# Patient Record
Sex: FEMALE | Race: BLACK OR AFRICAN AMERICAN | URBAN - METROPOLITAN AREA
[De-identification: names, ages, dates, MRNs, and addresses within clinical notes are randomized per-mention and may not be internally consistent; named-entity substitution may affect disease eponyms.]

---

## 2017-06-12 ENCOUNTER — OFFICE VISIT (OUTPATIENT)
Dept: GYNECOLOGY | Age: 47
End: 2017-06-12

## 2017-06-12 VITALS
OXYGEN SATURATION: 96 % | HEIGHT: 68 IN | DIASTOLIC BLOOD PRESSURE: 85 MMHG | BODY MASS INDEX: 21.79 KG/M2 | SYSTOLIC BLOOD PRESSURE: 138 MMHG | HEART RATE: 110 BPM | TEMPERATURE: 98.2 F | WEIGHT: 143.8 LBS

## 2017-06-12 DIAGNOSIS — Z01.411 ENCOUNTER FOR GYNECOLOGICAL EXAMINATION WITH ABNORMAL FINDING: Primary | ICD-10-CM

## 2017-06-12 DIAGNOSIS — D25.9 UTERINE LEIOMYOMA, UNSPECIFIED LOCATION: ICD-10-CM

## 2017-06-12 PROCEDURE — 99396 PREV VISIT EST AGE 40-64: CPT | Performed by: OBSTETRICS & GYNECOLOGY

## 2017-06-14 LAB
HPV COMMENT: NORMAL
HPV TYPE 16: NOT DETECTED
HPV TYPE 18: NOT DETECTED
HPVOH (OTHER TYPES): NOT DETECTED

## 2017-12-14 PROBLEM — S82.401A TIBIA/FIBULA FRACTURE, RIGHT, CLOSED, INITIAL ENCOUNTER: Status: ACTIVE | Noted: 2017-12-14

## 2017-12-14 PROBLEM — S82.201A TIBIA/FIBULA FRACTURE, RIGHT, CLOSED, INITIAL ENCOUNTER: Status: ACTIVE | Noted: 2017-12-14

## 2017-12-18 ENCOUNTER — TELEPHONE (OUTPATIENT)
Dept: GYNECOLOGY | Age: 47
End: 2017-12-18

## 2017-12-20 DIAGNOSIS — S82.401A TIBIA/FIBULA FRACTURE, RIGHT, CLOSED, INITIAL ENCOUNTER: Primary | ICD-10-CM

## 2017-12-20 DIAGNOSIS — S82.201A TIBIA/FIBULA FRACTURE, RIGHT, CLOSED, INITIAL ENCOUNTER: Primary | ICD-10-CM

## 2017-12-20 RX ORDER — HYDROCODONE BITARTRATE AND ACETAMINOPHEN 5; 325 MG/1; MG/1
1 TABLET ORAL EVERY 6 HOURS PRN
Qty: 28 TABLET | Refills: 0 | Status: SHIPPED | OUTPATIENT
Start: 2017-12-20 | End: 2020-09-15 | Stop reason: ALTCHOICE

## 2017-12-21 RX ORDER — HYDROCODONE BITARTRATE AND ACETAMINOPHEN 5; 325 MG/1; MG/1
1 TABLET ORAL EVERY 6 HOURS PRN
Qty: 28 TABLET | Refills: 0 | Status: SHIPPED | OUTPATIENT
Start: 2017-12-21 | End: 2020-09-15 | Stop reason: ALTCHOICE

## 2017-12-26 ENCOUNTER — TELEPHONE (OUTPATIENT)
Dept: ORTHOPEDIC SURGERY | Age: 47
End: 2017-12-26

## 2018-01-03 ENCOUNTER — OFFICE VISIT (OUTPATIENT)
Dept: INTERNAL MEDICINE CLINIC | Age: 48
End: 2018-01-03

## 2018-01-03 VITALS
TEMPERATURE: 98 F | DIASTOLIC BLOOD PRESSURE: 86 MMHG | OXYGEN SATURATION: 99 % | HEART RATE: 88 BPM | SYSTOLIC BLOOD PRESSURE: 134 MMHG

## 2018-01-03 DIAGNOSIS — S82.141A CLOSED FRACTURE OF RIGHT TIBIAL PLATEAU, INITIAL ENCOUNTER: Primary | ICD-10-CM

## 2018-01-03 DIAGNOSIS — D50.0 IRON DEFICIENCY ANEMIA DUE TO CHRONIC BLOOD LOSS: ICD-10-CM

## 2018-01-03 LAB
BASOPHILS ABSOLUTE: 0.2 K/UL (ref 0–0.2)
BASOPHILS RELATIVE PERCENT: 2.7 %
EOSINOPHILS ABSOLUTE: 0.3 K/UL (ref 0–0.6)
EOSINOPHILS RELATIVE PERCENT: 4.1 %
HCT VFR BLD CALC: 34.9 % (ref 36–48)
HEMOGLOBIN: 10.3 G/DL (ref 12–16)
IRON SATURATION: 11 % (ref 15–50)
IRON: 33 UG/DL (ref 37–145)
LYMPHOCYTES ABSOLUTE: 1.6 K/UL (ref 1–5.1)
LYMPHOCYTES RELATIVE PERCENT: 26.2 %
MCH RBC QN AUTO: 24.8 PG (ref 26–34)
MCHC RBC AUTO-ENTMCNC: 29.6 G/DL (ref 31–36)
MCV RBC AUTO: 83.8 FL (ref 80–100)
MONOCYTES ABSOLUTE: 0.5 K/UL (ref 0–1.3)
MONOCYTES RELATIVE PERCENT: 8.5 %
NEUTROPHILS ABSOLUTE: 3.7 K/UL (ref 1.7–7.7)
NEUTROPHILS RELATIVE PERCENT: 58.5 %
PDW BLD-RTO: 22.2 % (ref 12.4–15.4)
PLATELET # BLD: 632 K/UL (ref 135–450)
PMV BLD AUTO: 8.7 FL (ref 5–10.5)
RBC # BLD: 4.16 M/UL (ref 4–5.2)
TOTAL IRON BINDING CAPACITY: 302 UG/DL (ref 260–445)
VITAMIN D 25-HYDROXY: 15.9 NG/ML
WBC # BLD: 6.3 K/UL (ref 4–11)

## 2018-01-03 PROCEDURE — G8484 FLU IMMUNIZE NO ADMIN: HCPCS | Performed by: NURSE PRACTITIONER

## 2018-01-03 PROCEDURE — 99202 OFFICE O/P NEW SF 15 MIN: CPT | Performed by: NURSE PRACTITIONER

## 2018-01-03 PROCEDURE — G8420 CALC BMI NORM PARAMETERS: HCPCS | Performed by: NURSE PRACTITIONER

## 2018-01-03 PROCEDURE — G8427 DOCREV CUR MEDS BY ELIG CLIN: HCPCS | Performed by: NURSE PRACTITIONER

## 2018-01-03 PROCEDURE — 1111F DSCHRG MED/CURRENT MED MERGE: CPT | Performed by: NURSE PRACTITIONER

## 2018-01-03 PROCEDURE — 4004F PT TOBACCO SCREEN RCVD TLK: CPT | Performed by: NURSE PRACTITIONER

## 2018-01-03 RX ORDER — PNV NO.95/FERROUS FUM/FOLIC AC 28MG-0.8MG
TABLET ORAL
Qty: 60 TABLET | Refills: 0 | Status: SHIPPED | OUTPATIENT
Start: 2018-01-03 | End: 2018-02-13 | Stop reason: SDUPTHER

## 2018-01-03 RX ORDER — PNV NO.95/FERROUS FUM/FOLIC AC 28MG-0.8MG
1 TABLET ORAL DAILY
Qty: 30 TABLET | Refills: 0 | Status: SHIPPED | OUTPATIENT
Start: 2018-01-03 | End: 2018-01-03 | Stop reason: SDUPTHER

## 2018-01-05 RX ORDER — ERGOCALCIFEROL (VITAMIN D2) 1250 MCG
50000 CAPSULE ORAL WEEKLY
Qty: 12 CAPSULE | Refills: 1 | Status: SHIPPED | OUTPATIENT
Start: 2018-01-05 | End: 2018-06-15 | Stop reason: SDUPTHER

## 2018-01-10 ENCOUNTER — OFFICE VISIT (OUTPATIENT)
Dept: ORTHOPEDIC SURGERY | Age: 48
End: 2018-01-10

## 2018-01-10 VITALS — BODY MASS INDEX: 23.95 KG/M2 | WEIGHT: 158 LBS | RESPIRATION RATE: 16 BRPM | HEIGHT: 68 IN

## 2018-01-10 DIAGNOSIS — S82.201A TIBIA/FIBULA FRACTURE, RIGHT, CLOSED, INITIAL ENCOUNTER: Primary | ICD-10-CM

## 2018-01-10 DIAGNOSIS — S82.401A TIBIA/FIBULA FRACTURE, RIGHT, CLOSED, INITIAL ENCOUNTER: Primary | ICD-10-CM

## 2018-01-10 PROCEDURE — 99024 POSTOP FOLLOW-UP VISIT: CPT | Performed by: NURSE PRACTITIONER

## 2018-02-13 RX ORDER — FERROUS SULFATE 325(65) MG
TABLET ORAL
Qty: 60 TABLET | Refills: 3 | Status: SHIPPED | OUTPATIENT
Start: 2018-02-13 | End: 2018-06-01 | Stop reason: SDUPTHER

## 2018-03-07 ENCOUNTER — OFFICE VISIT (OUTPATIENT)
Dept: ORTHOPEDIC SURGERY | Age: 48
End: 2018-03-07

## 2018-03-07 VITALS — DIASTOLIC BLOOD PRESSURE: 84 MMHG | HEART RATE: 87 BPM | RESPIRATION RATE: 14 BRPM | SYSTOLIC BLOOD PRESSURE: 128 MMHG

## 2018-03-07 DIAGNOSIS — S82.201A TIBIA/FIBULA FRACTURE, RIGHT, CLOSED, INITIAL ENCOUNTER: Primary | ICD-10-CM

## 2018-03-07 DIAGNOSIS — S82.401A TIBIA/FIBULA FRACTURE, RIGHT, CLOSED, INITIAL ENCOUNTER: Primary | ICD-10-CM

## 2018-03-07 PROCEDURE — 99024 POSTOP FOLLOW-UP VISIT: CPT | Performed by: ORTHOPAEDIC SURGERY

## 2018-03-09 NOTE — PROGRESS NOTES
locking plate,  and doing very well. PLAN:  I have told the patient to work on ROM, as well as strengthening exercises. She can be WBAT using a walker with No heavy impact activities. The patient will come back for a follow up in 6 weeks. At that time, we will take four views right knee.     Rupal Beaver MD

## 2018-03-21 ENCOUNTER — HOSPITAL ENCOUNTER (OUTPATIENT)
Dept: OTHER | Age: 48
Discharge: HOME | End: 2018-03-31
Attending: ORTHOPAEDIC SURGERY | Admitting: ORTHOPAEDIC SURGERY

## 2018-03-21 ASSESSMENT — PAIN DESCRIPTION - FREQUENCY: FREQUENCY: CONTINUOUS

## 2018-03-21 ASSESSMENT — PAIN DESCRIPTION - LOCATION: LOCATION: LEG

## 2018-03-21 ASSESSMENT — PAIN SCALES - GENERAL: PAINLEVEL_OUTOF10: 5

## 2018-03-21 ASSESSMENT — PAIN DESCRIPTION - PROGRESSION: CLINICAL_PROGRESSION: GRADUALLY IMPROVING

## 2018-03-21 ASSESSMENT — PAIN DESCRIPTION - ORIENTATION: ORIENTATION: RIGHT

## 2018-03-21 ASSESSMENT — PAIN DESCRIPTION - ONSET: ONSET: SUDDEN

## 2018-03-21 ASSESSMENT — PAIN DESCRIPTION - DESCRIPTORS: DESCRIPTORS: ACHING;SORE;SHARP;CONSTANT

## 2018-03-21 ASSESSMENT — PAIN DESCRIPTION - PAIN TYPE: TYPE: SURGICAL PAIN

## 2018-03-23 ENCOUNTER — HOSPITAL ENCOUNTER (OUTPATIENT)
Dept: PHYSICAL THERAPY | Age: 48
Discharge: HOME | End: 2018-03-24
Admitting: ORTHOPAEDIC SURGERY
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
## 2018-03-27 ENCOUNTER — HOSPITAL ENCOUNTER (OUTPATIENT)
Dept: PHYSICAL THERAPY | Age: 48
Discharge: HOME | End: 2018-03-28
Admitting: ORTHOPAEDIC SURGERY

## 2018-03-29 ENCOUNTER — HOSPITAL ENCOUNTER (OUTPATIENT)
Dept: PHYSICAL THERAPY | Age: 48
Discharge: HOME | End: 2018-03-30
Admitting: ORTHOPAEDIC SURGERY

## 2018-04-01 ENCOUNTER — HOSPITAL ENCOUNTER (OUTPATIENT)
Dept: OTHER | Age: 48
Discharge: HOME | End: 2018-04-30
Attending: ORTHOPAEDIC SURGERY | Admitting: ORTHOPAEDIC SURGERY

## 2018-04-05 ENCOUNTER — HOSPITAL ENCOUNTER (OUTPATIENT)
Dept: PHYSICAL THERAPY | Age: 48
Discharge: HOME | End: 2018-04-06
Admitting: ORTHOPAEDIC SURGERY

## 2018-04-05 NOTE — FLOWSHEET NOTE
above  Objective: See eval  Observation:   Test measurements:      Exercises:  Exercise/Equipment Resistance/Repetitions Other comments   Nu step  X 6 min L4    Sl abd X 30    SLR 2 x 10         Heel slides X 30,  30 x 3      Added 03/23   Standing tke X 30    Step up 4\" x 20 ea    bosu X 2 min Added 03/23   Weight shifting in ll bars 2 min Added 0323   wobble X 2 min ea         Leg press 70# x 30, right 35# x 30    Step thru X 2 min    Standing hams stretch     inclijne 60 x 3    Gait in bars with emphasis on reaching terminal knee ext X 3 min    sls X 2 min                     Other Therapeutic Activities:    Reviewed LEFSTOOL and practiced with rolling walker. Advised pt to use walker as need and gradually wean from it as she becomes stronger. Home Exercise Program:    Gave written instructions for HEP and pt understands and performs well. Manual Treatments: mfr to quads, quads tendon, patellar mobs gr 3 x 10 min     Modalities:    CP x 10 min to right knee with bolster under knee  Timed Code Treatment Minutes:    42  Total Treatment Minutes:  55    Treatment/Activity Tolerance:  [x] Patient tolerated treatment well [] Patient limited by fatigue  [] Patient limited by pain  [] Patient limited by other medical complications  [] Other:     Prognosis: [x] Good [] Fair  [] Poor    Patient Requires Follow-up: [x] Yes  [] No    Plan:   [] Continue per plan of care [] Alter current plan (see comments)  [x] Plan of care initiated [] Hold pending MD visit [] Discharge  Plan for Next Session:    Advance exercises as tolerated for strengthening and increased knee ROM. Advance gait as able to without walker.  Told her to look into getting a cane , edema noted in inf knee, told her to make sure to ice as well,4/3/18  wirk on end range ext  Electronically signed by:  Fe Ramirez, PT

## 2018-04-17 ENCOUNTER — HOSPITAL ENCOUNTER (OUTPATIENT)
Dept: PHYSICAL THERAPY | Age: 48
Discharge: HOME | End: 2018-04-18
Admitting: ORTHOPAEDIC SURGERY
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
## 2018-05-01 ENCOUNTER — HOSPITAL ENCOUNTER (OUTPATIENT)
Dept: OTHER | Age: 48
Discharge: HOME | End: 2018-05-31
Attending: ORTHOPAEDIC SURGERY | Admitting: ORTHOPAEDIC SURGERY

## 2018-06-01 RX ORDER — FERROUS SULFATE 325(65) MG
TABLET ORAL
Qty: 60 TABLET | Refills: 3 | Status: SHIPPED | OUTPATIENT
Start: 2018-06-01 | End: 2020-09-15 | Stop reason: ALTCHOICE

## 2018-06-15 RX ORDER — ERGOCALCIFEROL 1.25 MG/1
50000 CAPSULE ORAL WEEKLY
Qty: 12 CAPSULE | Refills: 1 | Status: SHIPPED | OUTPATIENT
Start: 2018-06-15 | End: 2020-09-15 | Stop reason: ALTCHOICE

## 2020-09-04 ENCOUNTER — TELEPHONE (OUTPATIENT)
Dept: GYNECOLOGY | Age: 50
End: 2020-09-04

## 2020-09-15 ENCOUNTER — OFFICE VISIT (OUTPATIENT)
Dept: GYNECOLOGY | Age: 50
End: 2020-09-15
Payer: COMMERCIAL

## 2020-09-15 VITALS
HEIGHT: 68 IN | OXYGEN SATURATION: 99 % | RESPIRATION RATE: 16 BRPM | DIASTOLIC BLOOD PRESSURE: 85 MMHG | SYSTOLIC BLOOD PRESSURE: 136 MMHG | HEART RATE: 109 BPM | WEIGHT: 152 LBS | TEMPERATURE: 97.3 F | BODY MASS INDEX: 23.04 KG/M2

## 2020-09-15 DIAGNOSIS — D25.0 SUBMUCOUS AND SUBSEROUS LEIOMYOMA OF UTERUS: ICD-10-CM

## 2020-09-15 DIAGNOSIS — D25.2 SUBMUCOUS AND SUBSEROUS LEIOMYOMA OF UTERUS: ICD-10-CM

## 2020-09-15 LAB
HCT VFR BLD CALC: 30.6 % (ref 36–48)
HEMOGLOBIN: 9.6 G/DL (ref 12–16)
MCH RBC QN AUTO: 24 PG (ref 26–34)
MCHC RBC AUTO-ENTMCNC: 31.3 G/DL (ref 31–36)
MCV RBC AUTO: 76.7 FL (ref 80–100)
PDW BLD-RTO: 21.1 % (ref 12.4–15.4)
PLATELET # BLD: 385 K/UL (ref 135–450)
PMV BLD AUTO: 9.5 FL (ref 5–10.5)
RBC # BLD: 3.99 M/UL (ref 4–5.2)
WBC # BLD: 6.3 K/UL (ref 4–11)

## 2020-09-15 PROCEDURE — 99396 PREV VISIT EST AGE 40-64: CPT | Performed by: OBSTETRICS & GYNECOLOGY

## 2020-09-25 ENCOUNTER — OFFICE VISIT (OUTPATIENT)
Dept: GYNECOLOGY | Age: 50
End: 2020-09-25
Payer: COMMERCIAL

## 2020-09-25 VITALS
HEIGHT: 68 IN | WEIGHT: 155.6 LBS | BODY MASS INDEX: 23.58 KG/M2 | DIASTOLIC BLOOD PRESSURE: 90 MMHG | HEART RATE: 98 BPM | SYSTOLIC BLOOD PRESSURE: 138 MMHG | OXYGEN SATURATION: 100 % | RESPIRATION RATE: 16 BRPM | TEMPERATURE: 97.1 F

## 2020-09-25 PROCEDURE — G8420 CALC BMI NORM PARAMETERS: HCPCS | Performed by: OBSTETRICS & GYNECOLOGY

## 2020-09-25 PROCEDURE — 4004F PT TOBACCO SCREEN RCVD TLK: CPT | Performed by: OBSTETRICS & GYNECOLOGY

## 2020-09-25 PROCEDURE — G8427 DOCREV CUR MEDS BY ELIG CLIN: HCPCS | Performed by: OBSTETRICS & GYNECOLOGY

## 2020-09-25 PROCEDURE — 99214 OFFICE O/P EST MOD 30 MIN: CPT | Performed by: OBSTETRICS & GYNECOLOGY

## 2020-09-25 NOTE — H&P
Systems    Pertinent review of systems items discussed above. All others systems items not discussed above were negative. Physical Exam   BP (!) 138/90 (Site: Right Upper Arm, Position: Sitting, Cuff Size: Medium Adult)   Pulse 98   Temp 97.1 °F (36.2 °C) (Temporal)   Resp 16   Ht 5' 8\" (1.727 m)   Wt 155 lb 9.6 oz (70.6 kg)   LMP 09/08/2020 (Exact Date)   SpO2 100%   Breastfeeding No   BMI 23.66 kg/m²     Physical Exam    CV- RRR  resp- judy CTA  Labs    No results found for this or any previous visit (from the past 24 hour(s)). Imaging/Diagnostics Last 24 Hours   No results found. Assessment    fibroid uterus, chronic anemia    Plan   1. tahbso vert skin. I discussed the technique, recovery, and risks with patient. They include but are not limited to bleeding, infection, damage to internal organs (e.g., bladder, bowel, ureters). Patient voiced understanding and agrees to proceed. 2. 30 min >50% of time was spent discussing findings, management, and treatment options.   3.     Consultations Ordered:  None    Electronically signed by Chani Esqueda MD on 2/78/80 at 11:10 AM EDT

## 2020-09-28 ENCOUNTER — HOSPITAL ENCOUNTER (OUTPATIENT)
Dept: ULTRASOUND IMAGING | Age: 50
Discharge: HOME | End: 2020-09-28
Payer: COMMERCIAL

## 2020-09-28 PROCEDURE — 76856 US EXAM PELVIC COMPLETE: CPT

## 2020-10-01 ENCOUNTER — TELEPHONE (OUTPATIENT)
Dept: FAMILY MEDICINE CLINIC | Age: 50
End: 2020-10-01

## 2020-10-02 ENCOUNTER — TELEPHONE (OUTPATIENT)
Dept: GYNECOLOGY | Age: 50
End: 2020-10-02

## 2020-10-20 ENCOUNTER — TELEPHONE (OUTPATIENT)
Dept: FAMILY MEDICINE CLINIC | Age: 50
End: 2020-10-20

## 2020-10-20 NOTE — PROGRESS NOTES
Called patient for PAT health information and surgery instructions. Patient states she is canceling surgery. Patient instructed to call Dr. Arlen Cole office to inform them.

## 2020-10-20 NOTE — TELEPHONE ENCOUNTER
Per Thanh Later from [place] [address]. Patient informed Thanh Later that she is not showing up for surgery this Thursday with Dr. Roslyn Samuel.

## 2020-10-21 ENCOUNTER — ANESTHESIA EVENT (OUTPATIENT)
Dept: OPERATING ROOM | Age: 50
DRG: 519 | End: 2020-10-21
Payer: COMMERCIAL

## 2020-10-21 ENCOUNTER — TELEPHONE (OUTPATIENT)
Dept: GYNECOLOGY | Age: 50
End: 2020-10-21

## 2020-10-21 NOTE — TELEPHONE ENCOUNTER
[facility] was calling stating pt was cancelling her surgery I called josi she spoke with the pt she is on for surgery told [facility] to call her back she is still on for surgery

## 2020-10-21 NOTE — TELEPHONE ENCOUNTER
I called pt and asked if she was still getting surgery for tomorrow. She stated she was nervous but was still going to be there. I told her where to go and reminded her what time to be there and pt stated she understood. I also told her that she would be swabbed for covid in morning since she did not get it on Friday. I told pt if she decided against surgery to please call me today because I have o.r team on hold she stated she would be there but would call if not .

## 2020-10-21 NOTE — PROGRESS NOTES
C-Difficile admission screening and protocol:     * Admitted with diarrhea? NO_____     *Prior history of C-Diff. In last 3 months   NO_____     *Antibiotic use in the past 6-8 weeks? NO______                 If yes which  ANTIBIOTIC AND REASON______     *Prior hospitalization or nursing home in the last month? No             _Preoperative Screening for Elective Surgery/Invasive Procedures While COVID-19 present in the community     Have you tested positive or have been told to self-isolate for COVID-19 like symptoms within the past 28 days? N   Do you currently have any of the following symptoms? o Fever >100.0 F or 99.9 F in immunocompromised patients? N  o New onset cough, shortness of breath or difficulty breathing? N  o New onset sore throat, myalgia (muscle aches and pains), headache, loss of taste/smell or diarrhea? N   Have you had a potential exposure to COVID-19 within the past 14 days by:  o Close contact with a confirmed case? N  o Close contact with a healthcare worker,  or essential infrastructure worker (grocery store, [employer], gas station, public utilities or transportation)? N  o Do you reside in a congregate setting such as; skilled nursing facility, adult home, correctional facility, homeless shelter or other institutional setting? N  o Have you had recent travel to a known COVID-19 hotspot? N    Indicate if the patient has a positive screen by answering yes to one or more of the above questions. Patients who test positive or screen positive prior to surgery or on the day of surgery should be evaluated in conjunction with the surgeon/proceduralist/anesthesiologist to determine the urgency of the procedure. [id number]  time___0700_________        Surgery time______0830______    Take the following medications with a sip of water:    Do not eat or drink anything after 12:00 midnight prior to your surgery.   This please bring a case for them. If you have a living will and a durable power of  for healthcare, please bring in a copy. As part of our patient safety program to minimize surgical site infections, we ask you to do the following:    · Please notify your surgeon if you develop any illness between         now and the  day of your surgery. · This includes a cough, cold, fever, sore throat, nausea,         or vomiting, and diarrhea, etc.  ·  Please notify your surgeon if you experience dizziness, shortness         of breath or blurred vision between now and the time of your surgery. Do not shave your operative site 96 hours prior to surgery. For face and neck surgery, men may use an electric razor 48 hours   prior to surgery. You may shower the night before surgery or the morning of   your surgery with an antibacterial soap. You will need to bring a photo ID and insurance card    [facility] has an onsite pharmacy, would you like to utilize our pharmacy     If you will be staying overnight and use a C-pap machine, please bring   your C-pap to hospital     Our goal is to provide you with excellent care, therefore, visitors will be limited to two(2) in the room at a time so that we may focus on providing this care for you. Please contact pre-admission testing if you have any further questions. [facility] phone number:  [phone] PAT fax number:  [phone]  Please note these are generalized instructions for all surgical cases, you may be provided with more specific instructions according to your surgery.

## 2020-10-22 ENCOUNTER — ANESTHESIA (OUTPATIENT)
Dept: OPERATING ROOM | Age: 50
DRG: 519 | End: 2020-10-22
Payer: COMMERCIAL

## 2020-10-22 ENCOUNTER — HOSPITAL ENCOUNTER (INPATIENT)
Age: 50
LOS: 2 days | Discharge: HOME | DRG: 519 | End: 2020-10-24
Attending: OBSTETRICS & GYNECOLOGY | Admitting: OBSTETRICS & GYNECOLOGY
Payer: COMMERCIAL

## 2020-10-22 ENCOUNTER — APPOINTMENT (OUTPATIENT)
Dept: GENERAL RADIOLOGY | Age: 50
DRG: 519 | End: 2020-10-22
Attending: OBSTETRICS & GYNECOLOGY
Payer: COMMERCIAL

## 2020-10-22 VITALS
SYSTOLIC BLOOD PRESSURE: 130 MMHG | DIASTOLIC BLOOD PRESSURE: 70 MMHG | TEMPERATURE: 96.1 F | RESPIRATION RATE: 3 BRPM | OXYGEN SATURATION: 100 %

## 2020-10-22 PROBLEM — Z90.710 S/P TAH-BSO (TOTAL ABDOMINAL HYSTERECTOMY AND BILATERAL SALPINGO-OOPHORECTOMY): Status: ACTIVE | Noted: 2020-10-22

## 2020-10-22 PROBLEM — Z90.722 S/P TAH-BSO (TOTAL ABDOMINAL HYSTERECTOMY AND BILATERAL SALPINGO-OOPHORECTOMY): Status: ACTIVE | Noted: 2020-10-22

## 2020-10-22 PROBLEM — Z90.79 S/P TAH-BSO (TOTAL ABDOMINAL HYSTERECTOMY AND BILATERAL SALPINGO-OOPHORECTOMY): Status: ACTIVE | Noted: 2020-10-22

## 2020-10-22 LAB
PREGNANCY, URINE: NEGATIVE
SARS-COV-2, NAAT: NOT DETECTED

## 2020-10-22 PROCEDURE — 2580000003 HC RX 258: Performed by: OBSTETRICS & GYNECOLOGY

## 2020-10-22 PROCEDURE — 74018 RADEX ABDOMEN 1 VIEW: CPT

## 2020-10-22 PROCEDURE — 3600000005 HC SURGERY LEVEL 5 BASE: Performed by: OBSTETRICS & GYNECOLOGY

## 2020-10-22 PROCEDURE — 6360000002 HC RX W HCPCS: Performed by: ANESTHESIOLOGY

## 2020-10-22 PROCEDURE — 2580000003 HC RX 258: Performed by: ANESTHESIOLOGY

## 2020-10-22 PROCEDURE — 0UT70ZZ RESECTION OF BILATERAL FALLOPIAN TUBES, OPEN APPROACH: ICD-10-PCS | Performed by: OBSTETRICS & GYNECOLOGY

## 2020-10-22 PROCEDURE — 6360000002 HC RX W HCPCS

## 2020-10-22 PROCEDURE — U0002 COVID-19 LAB TEST NON-CDC: HCPCS

## 2020-10-22 PROCEDURE — 84703 CHORIONIC GONADOTROPIN ASSAY: CPT

## 2020-10-22 PROCEDURE — 6360000002 HC RX W HCPCS: Performed by: OBSTETRICS & GYNECOLOGY

## 2020-10-22 PROCEDURE — 2700000000 HC OXYGEN THERAPY PER DAY

## 2020-10-22 PROCEDURE — 1200000000 HC SEMI PRIVATE

## 2020-10-22 PROCEDURE — 2500000003 HC RX 250 WO HCPCS

## 2020-10-22 PROCEDURE — 0UT20ZZ RESECTION OF BILATERAL OVARIES, OPEN APPROACH: ICD-10-PCS | Performed by: OBSTETRICS & GYNECOLOGY

## 2020-10-22 PROCEDURE — 88307 TISSUE EXAM BY PATHOLOGIST: CPT

## 2020-10-22 PROCEDURE — 0UT90ZZ RESECTION OF UTERUS, OPEN APPROACH: ICD-10-PCS | Performed by: OBSTETRICS & GYNECOLOGY

## 2020-10-22 PROCEDURE — 94770 HC ETCO2 MONITOR DAILY: CPT

## 2020-10-22 PROCEDURE — 58150 TOTAL HYSTERECTOMY: CPT | Performed by: OBSTETRICS & GYNECOLOGY

## 2020-10-22 PROCEDURE — 2709999900 HC NON-CHARGEABLE SUPPLY: Performed by: OBSTETRICS & GYNECOLOGY

## 2020-10-22 PROCEDURE — 6370000000 HC RX 637 (ALT 250 FOR IP): Performed by: OBSTETRICS & GYNECOLOGY

## 2020-10-22 PROCEDURE — 94760 N-INVAS EAR/PLS OXIMETRY 1: CPT

## 2020-10-22 PROCEDURE — 3600000015 HC SURGERY LEVEL 5 ADDTL 15MIN: Performed by: OBSTETRICS & GYNECOLOGY

## 2020-10-22 PROCEDURE — 3700000000 HC ANESTHESIA ATTENDED CARE: Performed by: OBSTETRICS & GYNECOLOGY

## 2020-10-22 PROCEDURE — 3700000001 HC ADD 15 MINUTES (ANESTHESIA): Performed by: OBSTETRICS & GYNECOLOGY

## 2020-10-22 PROCEDURE — 7100000000 HC PACU RECOVERY - FIRST 15 MIN: Performed by: OBSTETRICS & GYNECOLOGY

## 2020-10-22 PROCEDURE — 7100000001 HC PACU RECOVERY - ADDTL 15 MIN: Performed by: OBSTETRICS & GYNECOLOGY

## 2020-10-22 RX ORDER — SODIUM CHLORIDE 9 MG/ML
INJECTION, SOLUTION INTRAVENOUS CONTINUOUS
Status: DISCONTINUED | OUTPATIENT
Start: 2020-10-22 | End: 2020-10-22

## 2020-10-22 RX ORDER — LIDOCAINE HYDROCHLORIDE 20 MG/ML
INJECTION, SOLUTION EPIDURAL; INFILTRATION; INTRACAUDAL; PERINEURAL PRN
Status: DISCONTINUED | OUTPATIENT
Start: 2020-10-22 | End: 2020-10-22 | Stop reason: SDUPTHER

## 2020-10-22 RX ORDER — LABETALOL HYDROCHLORIDE 5 MG/ML
INJECTION, SOLUTION INTRAVENOUS PRN
Status: DISCONTINUED | OUTPATIENT
Start: 2020-10-22 | End: 2020-10-22 | Stop reason: SDUPTHER

## 2020-10-22 RX ORDER — DIPHENHYDRAMINE HYDROCHLORIDE 50 MG/ML
INJECTION INTRAMUSCULAR; INTRAVENOUS
Status: COMPLETED
Start: 2020-10-22 | End: 2020-10-22

## 2020-10-22 RX ORDER — SODIUM CHLORIDE 0.9 % (FLUSH) 0.9 %
10 SYRINGE (ML) INJECTION EVERY 12 HOURS SCHEDULED
Status: DISCONTINUED | OUTPATIENT
Start: 2020-10-22 | End: 2020-10-22 | Stop reason: HOSPADM

## 2020-10-22 RX ORDER — FENTANYL CITRATE 50 UG/ML
INJECTION, SOLUTION INTRAMUSCULAR; INTRAVENOUS PRN
Status: DISCONTINUED | OUTPATIENT
Start: 2020-10-22 | End: 2020-10-22 | Stop reason: SDUPTHER

## 2020-10-22 RX ORDER — OXYCODONE HYDROCHLORIDE AND ACETAMINOPHEN 5; 325 MG/1; MG/1
1 TABLET ORAL PRN
Status: DISCONTINUED | OUTPATIENT
Start: 2020-10-22 | End: 2020-10-22 | Stop reason: HOSPADM

## 2020-10-22 RX ORDER — ONDANSETRON 2 MG/ML
4 INJECTION INTRAMUSCULAR; INTRAVENOUS
Status: COMPLETED | OUTPATIENT
Start: 2020-10-22 | End: 2020-10-22

## 2020-10-22 RX ORDER — PROPOFOL 10 MG/ML
INJECTION, EMULSION INTRAVENOUS PRN
Status: DISCONTINUED | OUTPATIENT
Start: 2020-10-22 | End: 2020-10-22 | Stop reason: SDUPTHER

## 2020-10-22 RX ORDER — MORPHINE SULFATE/0.9% NACL/PF 1 MG/ML
SYRINGE (ML) INJECTION CONTINUOUS
Status: DISCONTINUED | OUTPATIENT
Start: 2020-10-22 | End: 2020-10-23

## 2020-10-22 RX ORDER — SODIUM CHLORIDE 0.9 % (FLUSH) 0.9 %
10 SYRINGE (ML) INJECTION PRN
Status: DISCONTINUED | OUTPATIENT
Start: 2020-10-22 | End: 2020-10-22 | Stop reason: HOSPADM

## 2020-10-22 RX ORDER — ONDANSETRON 2 MG/ML
4 INJECTION INTRAMUSCULAR; INTRAVENOUS EVERY 6 HOURS PRN
Status: DISCONTINUED | OUTPATIENT
Start: 2020-10-22 | End: 2020-10-24 | Stop reason: HOSPADM

## 2020-10-22 RX ORDER — PROMETHAZINE HYDROCHLORIDE 25 MG/1
12.5 TABLET ORAL EVERY 6 HOURS PRN
Status: DISCONTINUED | OUTPATIENT
Start: 2020-10-22 | End: 2020-10-24 | Stop reason: HOSPADM

## 2020-10-22 RX ORDER — ONDANSETRON 2 MG/ML
INJECTION INTRAMUSCULAR; INTRAVENOUS PRN
Status: DISCONTINUED | OUTPATIENT
Start: 2020-10-22 | End: 2020-10-22 | Stop reason: SDUPTHER

## 2020-10-22 RX ORDER — OXYCODONE HYDROCHLORIDE AND ACETAMINOPHEN 5; 325 MG/1; MG/1
2 TABLET ORAL PRN
Status: DISCONTINUED | OUTPATIENT
Start: 2020-10-22 | End: 2020-10-22 | Stop reason: HOSPADM

## 2020-10-22 RX ORDER — ACETAMINOPHEN 325 MG/1
650 TABLET ORAL EVERY 4 HOURS PRN
Status: DISCONTINUED | OUTPATIENT
Start: 2020-10-22 | End: 2020-10-24 | Stop reason: HOSPADM

## 2020-10-22 RX ORDER — DOCUSATE SODIUM 100 MG/1
100 CAPSULE, LIQUID FILLED ORAL 2 TIMES DAILY
Status: DISCONTINUED | OUTPATIENT
Start: 2020-10-22 | End: 2020-10-24 | Stop reason: HOSPADM

## 2020-10-22 RX ORDER — SODIUM CHLORIDE, SODIUM LACTATE, POTASSIUM CHLORIDE, CALCIUM CHLORIDE 600; 310; 30; 20 MG/100ML; MG/100ML; MG/100ML; MG/100ML
INJECTION, SOLUTION INTRAVENOUS CONTINUOUS
Status: DISCONTINUED | OUTPATIENT
Start: 2020-10-22 | End: 2020-10-23

## 2020-10-22 RX ORDER — FENTANYL CITRATE 50 UG/ML
25 INJECTION, SOLUTION INTRAMUSCULAR; INTRAVENOUS EVERY 5 MIN PRN
Status: DISCONTINUED | OUTPATIENT
Start: 2020-10-22 | End: 2020-10-22 | Stop reason: HOSPADM

## 2020-10-22 RX ORDER — MIDAZOLAM HYDROCHLORIDE 1 MG/ML
INJECTION INTRAMUSCULAR; INTRAVENOUS PRN
Status: DISCONTINUED | OUTPATIENT
Start: 2020-10-22 | End: 2020-10-22 | Stop reason: SDUPTHER

## 2020-10-22 RX ORDER — DIPHENHYDRAMINE HYDROCHLORIDE 50 MG/ML
12.5 INJECTION INTRAMUSCULAR; INTRAVENOUS ONCE
Status: CANCELLED | OUTPATIENT
Start: 2020-10-22

## 2020-10-22 RX ORDER — GLYCOPYRROLATE 0.2 MG/ML
INJECTION INTRAMUSCULAR; INTRAVENOUS PRN
Status: DISCONTINUED | OUTPATIENT
Start: 2020-10-22 | End: 2020-10-22 | Stop reason: SDUPTHER

## 2020-10-22 RX ORDER — NALOXONE HYDROCHLORIDE 0.4 MG/ML
0.4 INJECTION, SOLUTION INTRAMUSCULAR; INTRAVENOUS; SUBCUTANEOUS PRN
Status: DISCONTINUED | OUTPATIENT
Start: 2020-10-22 | End: 2020-10-24 | Stop reason: HOSPADM

## 2020-10-22 RX ORDER — SODIUM CHLORIDE 0.9 % (FLUSH) 0.9 %
10 SYRINGE (ML) INJECTION PRN
Status: DISCONTINUED | OUTPATIENT
Start: 2020-10-22 | End: 2020-10-24 | Stop reason: HOSPADM

## 2020-10-22 RX ORDER — ROCURONIUM BROMIDE 10 MG/ML
INJECTION, SOLUTION INTRAVENOUS PRN
Status: DISCONTINUED | OUTPATIENT
Start: 2020-10-22 | End: 2020-10-22 | Stop reason: SDUPTHER

## 2020-10-22 RX ORDER — MAGNESIUM HYDROXIDE 1200 MG/15ML
LIQUID ORAL CONTINUOUS PRN
Status: COMPLETED | OUTPATIENT
Start: 2020-10-22 | End: 2020-10-22

## 2020-10-22 RX ORDER — SODIUM CHLORIDE 0.9 % (FLUSH) 0.9 %
10 SYRINGE (ML) INJECTION EVERY 12 HOURS SCHEDULED
Status: DISCONTINUED | OUTPATIENT
Start: 2020-10-22 | End: 2020-10-24 | Stop reason: HOSPADM

## 2020-10-22 RX ORDER — DEXAMETHASONE SODIUM PHOSPHATE 4 MG/ML
INJECTION, SOLUTION INTRA-ARTICULAR; INTRALESIONAL; INTRAMUSCULAR; INTRAVENOUS; SOFT TISSUE PRN
Status: DISCONTINUED | OUTPATIENT
Start: 2020-10-22 | End: 2020-10-22 | Stop reason: SDUPTHER

## 2020-10-22 RX ADMIN — MIDAZOLAM 2 MG: 1 INJECTION INTRAMUSCULAR; INTRAVENOUS at 08:28

## 2020-10-22 RX ADMIN — SODIUM CHLORIDE: 9 INJECTION, SOLUTION INTRAVENOUS at 08:13

## 2020-10-22 RX ADMIN — DEXAMETHASONE SODIUM PHOSPHATE 10 MG: 4 INJECTION, SOLUTION INTRAMUSCULAR; INTRAVENOUS at 08:37

## 2020-10-22 RX ADMIN — FENTANYL CITRATE 50 MCG: 50 INJECTION INTRAMUSCULAR; INTRAVENOUS at 08:32

## 2020-10-22 RX ADMIN — MORPHINE SULFATE 30 MG: 1 INJECTION INTRAVENOUS at 12:17

## 2020-10-22 RX ADMIN — HYDROMORPHONE HYDROCHLORIDE 0.5 MG: 1 INJECTION, SOLUTION INTRAMUSCULAR; INTRAVENOUS; SUBCUTANEOUS at 09:05

## 2020-10-22 RX ADMIN — SUGAMMADEX 200 MG: 100 INJECTION, SOLUTION INTRAVENOUS at 10:47

## 2020-10-22 RX ADMIN — HYDROMORPHONE HYDROCHLORIDE 0.5 MG: 1 INJECTION, SOLUTION INTRAMUSCULAR; INTRAVENOUS; SUBCUTANEOUS at 08:57

## 2020-10-22 RX ADMIN — SODIUM CHLORIDE: 9 INJECTION, SOLUTION INTRAVENOUS at 09:56

## 2020-10-22 RX ADMIN — ONDANSETRON 4 MG: 2 INJECTION INTRAMUSCULAR; INTRAVENOUS at 12:49

## 2020-10-22 RX ADMIN — LIDOCAINE HYDROCHLORIDE 50 MG: 20 INJECTION, SOLUTION EPIDURAL; INFILTRATION; INTRACAUDAL; PERINEURAL at 08:37

## 2020-10-22 RX ADMIN — ROCURONIUM BROMIDE 50 MG: 10 INJECTION INTRAVENOUS at 08:38

## 2020-10-22 RX ADMIN — ROCURONIUM BROMIDE 5 MG: 10 INJECTION INTRAVENOUS at 10:24

## 2020-10-22 RX ADMIN — DOCUSATE SODIUM 100 MG: 100 CAPSULE, LIQUID FILLED ORAL at 20:04

## 2020-10-22 RX ADMIN — SODIUM CHLORIDE: 9 INJECTION, SOLUTION INTRAVENOUS at 10:48

## 2020-10-22 RX ADMIN — SODIUM CHLORIDE, POTASSIUM CHLORIDE, SODIUM LACTATE AND CALCIUM CHLORIDE: 600; 310; 30; 20 INJECTION, SOLUTION INTRAVENOUS at 15:20

## 2020-10-22 RX ADMIN — SODIUM CHLORIDE: 9 INJECTION, SOLUTION INTRAVENOUS at 08:28

## 2020-10-22 RX ADMIN — HYDROMORPHONE HYDROCHLORIDE 0.5 MG: 1 INJECTION, SOLUTION INTRAMUSCULAR; INTRAVENOUS; SUBCUTANEOUS at 12:20

## 2020-10-22 RX ADMIN — DIPHENHYDRAMINE HYDROCHLORIDE 25 MG: 50 INJECTION, SOLUTION INTRAMUSCULAR; INTRAVENOUS at 13:01

## 2020-10-22 RX ADMIN — CEFAZOLIN SODIUM 2 G: 10 INJECTION, POWDER, FOR SOLUTION INTRAVENOUS at 08:31

## 2020-10-22 RX ADMIN — PROPOFOL 180 MG: 10 INJECTION, EMULSION INTRAVENOUS at 08:37

## 2020-10-22 RX ADMIN — LABETALOL HYDROCHLORIDE 2.5 MG: 5 INJECTION, SOLUTION INTRAVENOUS at 09:17

## 2020-10-22 RX ADMIN — FENTANYL CITRATE 50 MCG: 50 INJECTION INTRAMUSCULAR; INTRAVENOUS at 08:44

## 2020-10-22 RX ADMIN — ONDANSETRON 4 MG: 2 INJECTION INTRAMUSCULAR; INTRAVENOUS at 10:51

## 2020-10-22 RX ADMIN — HYDROMORPHONE HYDROCHLORIDE 0.5 MG: 1 INJECTION, SOLUTION INTRAMUSCULAR; INTRAVENOUS; SUBCUTANEOUS at 10:54

## 2020-10-22 RX ADMIN — GLYCOPYRROLATE 0.2 MG: 0.2 INJECTION, SOLUTION INTRAMUSCULAR; INTRAVENOUS at 09:20

## 2020-10-22 RX ADMIN — HYDROMORPHONE HYDROCHLORIDE 0.5 MG: 1 INJECTION, SOLUTION INTRAMUSCULAR; INTRAVENOUS; SUBCUTANEOUS at 12:04

## 2020-10-22 RX ADMIN — ROCURONIUM BROMIDE 10 MG: 10 INJECTION INTRAVENOUS at 09:40

## 2020-10-22 RX ADMIN — HYDROMORPHONE HYDROCHLORIDE 0.5 MG: 1 INJECTION, SOLUTION INTRAMUSCULAR; INTRAVENOUS; SUBCUTANEOUS at 10:50

## 2020-10-22 ASSESSMENT — PULMONARY FUNCTION TESTS
PIF_VALUE: 16
PIF_VALUE: 17
PIF_VALUE: 15
PIF_VALUE: 16
PIF_VALUE: 3
PIF_VALUE: 17
PIF_VALUE: 16
PIF_VALUE: 14
PIF_VALUE: 17
PIF_VALUE: 16
PIF_VALUE: 3
PIF_VALUE: 14
PIF_VALUE: 16
PIF_VALUE: 17
PIF_VALUE: 16
PIF_VALUE: 3
PIF_VALUE: 0
PIF_VALUE: 2
PIF_VALUE: 3
PIF_VALUE: 16
PIF_VALUE: 17
PIF_VALUE: 3
PIF_VALUE: 16
PIF_VALUE: 16
PIF_VALUE: 21
PIF_VALUE: 16
PIF_VALUE: 17
PIF_VALUE: 3
PIF_VALUE: 17
PIF_VALUE: 16
PIF_VALUE: 3
PIF_VALUE: 16
PIF_VALUE: 17
PIF_VALUE: 16
PIF_VALUE: 17
PIF_VALUE: 17
PIF_VALUE: 14
PIF_VALUE: 3
PIF_VALUE: 3
PIF_VALUE: 17
PIF_VALUE: 17
PIF_VALUE: 15
PIF_VALUE: 3
PIF_VALUE: 15
PIF_VALUE: 16
PIF_VALUE: 16
PIF_VALUE: 15
PIF_VALUE: 16
PIF_VALUE: 18
PIF_VALUE: 3
PIF_VALUE: 16
PIF_VALUE: 15
PIF_VALUE: 3
PIF_VALUE: 17
PIF_VALUE: 16
PIF_VALUE: 3
PIF_VALUE: 17
PIF_VALUE: 17
PIF_VALUE: 16
PIF_VALUE: 2
PIF_VALUE: 17
PIF_VALUE: 3
PIF_VALUE: 16
PIF_VALUE: 2
PIF_VALUE: 3
PIF_VALUE: 16
PIF_VALUE: 3
PIF_VALUE: 16
PIF_VALUE: 3
PIF_VALUE: 3
PIF_VALUE: 16
PIF_VALUE: 3
PIF_VALUE: 17
PIF_VALUE: 16
PIF_VALUE: 0
PIF_VALUE: 15
PIF_VALUE: 3
PIF_VALUE: 3
PIF_VALUE: 17
PIF_VALUE: 17
PIF_VALUE: 14
PIF_VALUE: 18
PIF_VALUE: 17
PIF_VALUE: 3
PIF_VALUE: 14
PIF_VALUE: 16
PIF_VALUE: 18
PIF_VALUE: 3
PIF_VALUE: 14
PIF_VALUE: 16
PIF_VALUE: 17
PIF_VALUE: 16
PIF_VALUE: 3
PIF_VALUE: 2
PIF_VALUE: 3
PIF_VALUE: 19
PIF_VALUE: 3
PIF_VALUE: 17
PIF_VALUE: 22
PIF_VALUE: 17
PIF_VALUE: 16
PIF_VALUE: 16
PIF_VALUE: 17
PIF_VALUE: 16
PIF_VALUE: 17
PIF_VALUE: 17
PIF_VALUE: 0
PIF_VALUE: 15
PIF_VALUE: 16
PIF_VALUE: 17
PIF_VALUE: 15
PIF_VALUE: 3
PIF_VALUE: 14
PIF_VALUE: 18
PIF_VALUE: 15
PIF_VALUE: 17
PIF_VALUE: 3
PIF_VALUE: 17
PIF_VALUE: 15
PIF_VALUE: 16
PIF_VALUE: 16
PIF_VALUE: 17
PIF_VALUE: 16
PIF_VALUE: 14
PIF_VALUE: 16
PIF_VALUE: 3
PIF_VALUE: 17
PIF_VALUE: 16
PIF_VALUE: 17
PIF_VALUE: 16
PIF_VALUE: 18
PIF_VALUE: 15
PIF_VALUE: 17
PIF_VALUE: 16
PIF_VALUE: 14
PIF_VALUE: 16
PIF_VALUE: 14
PIF_VALUE: 0
PIF_VALUE: 16
PIF_VALUE: 27
PIF_VALUE: 17
PIF_VALUE: 16
PIF_VALUE: 17
PIF_VALUE: 17

## 2020-10-22 ASSESSMENT — PAIN DESCRIPTION - LOCATION
LOCATION: ABDOMEN

## 2020-10-22 ASSESSMENT — PAIN DESCRIPTION - ORIENTATION
ORIENTATION: MID;LOWER
ORIENTATION: LOWER;MID
ORIENTATION: LOWER;MID
ORIENTATION: MID;LOWER
ORIENTATION: LOWER;MID
ORIENTATION: LOWER;MID

## 2020-10-22 ASSESSMENT — PAIN - FUNCTIONAL ASSESSMENT
PAIN_FUNCTIONAL_ASSESSMENT: PREVENTS OR INTERFERES SOME ACTIVE ACTIVITIES AND ADLS
PAIN_FUNCTIONAL_ASSESSMENT: PREVENTS OR INTERFERES SOME ACTIVE ACTIVITIES AND ADLS
PAIN_FUNCTIONAL_ASSESSMENT: 0-10
PAIN_FUNCTIONAL_ASSESSMENT: PREVENTS OR INTERFERES SOME ACTIVE ACTIVITIES AND ADLS

## 2020-10-22 ASSESSMENT — PAIN DESCRIPTION - FREQUENCY
FREQUENCY: CONTINUOUS

## 2020-10-22 ASSESSMENT — PAIN DESCRIPTION - ONSET
ONSET: ON-GOING

## 2020-10-22 ASSESSMENT — PAIN DESCRIPTION - DESCRIPTORS
DESCRIPTORS: ACHING;BURNING;CONSTANT
DESCRIPTORS: SORE
DESCRIPTORS: ACHING;BURNING;CONSTANT;CRAMPING
DESCRIPTORS: PRESSURE
DESCRIPTORS: SORE
DESCRIPTORS: DULL
DESCRIPTORS: ACHING;BURNING;CONSTANT

## 2020-10-22 ASSESSMENT — PAIN SCALES - GENERAL
PAINLEVEL_OUTOF10: 10
PAINLEVEL_OUTOF10: 0
PAINLEVEL_OUTOF10: 10
PAINLEVEL_OUTOF10: 0
PAINLEVEL_OUTOF10: 10
PAINLEVEL_OUTOF10: 4
PAINLEVEL_OUTOF10: 4
PAINLEVEL_OUTOF10: 1
PAINLEVEL_OUTOF10: 0

## 2020-10-22 ASSESSMENT — PAIN DESCRIPTION - PROGRESSION
CLINICAL_PROGRESSION: GRADUALLY IMPROVING

## 2020-10-22 ASSESSMENT — PAIN DESCRIPTION - PAIN TYPE
TYPE: SURGICAL PAIN
TYPE: SURGICAL PAIN;ACUTE PAIN

## 2020-10-22 NOTE — OP NOTE
[address] [place] [address] [address]                                OPERATIVE REPORT    PATIENT NAME: Constanza Morgan                     :        1970  MED REC NO:   [id number]                          ROOM:       5836  ACCOUNT NO:   [de-identified]                           ADMIT DATE: 10/22/2020  PROVIDER:     Dee Felty. Flick, MD    DATE OF PROCEDURE:  10/22/2020    PREOPERATIVE DIAGNOSES:  Chronic blood loss anemia, fibroid uterus. POSTOPERATIVE DIAGNOSIS:  Chronic blood loss anemia, fibroid uterus. OPERATION PERFORMED:  Total abdominal hysterectomy, bilateral  salpingo-oophorectomy with vertical skin incision. SURGEON:  Edwin Sloan MD    ANESTHESIA:  General endotracheal anesthetic. ESTIMATED BLOOD LOSS:  750 mL. PATHOLOGY:  Uterus, cervix, ovaries, tubes. DRAINS:  Roberts. COMPLICATIONS:  None. DISPOSITION:  Stable to recovery room. INDICATIONS:  The patient is a 78-year-old female. She is  1,  para 1-0-0-1, presents with heavy vaginal bleeding for over half the  month. Exam shows that the patient has a 20-week size uterus. Ultrasound showed a 17-cm uterine dimension. Hemoglobin was 9.6. Previous CT scan showed a fibroid uterus. I offered the patient the  option of a total abdominal hysterectomy and bilateral  salpingo-oophorectomy with a vertical skin incision. I discussed the  technique, the recovery, and the risks with the patient. They include,  but are not limited to, bleeding, infection, damage to her internal  organs such as bladder and bowels and ureters with need for subsequent  reparative surgery if damage occurred. The patient voiced understanding  and agreed to proceed with the surgery. OPERATIVE PROCEDURE:  The patient was taken to the operating room and  was prepped and draped in normal sterile fashion in the dorsal supine  position.   A vertical skin incision was made with a scalpel. It was  carried down to the layer of the fascia. The fascia was nicked in the  midline. The incision was extended superiorly and inferiorly. The  fascia was  from the underlying rectus muscle using sharp  dissection. The rectus muscles were  in the midline. The peritoneum was entered sharply. The incision was extended  superiorly and inferiorly with good visualization of bladder. The  uterus was able to be exteriorized. The round ligaments were doubly  ligated and transected. The vesicouterine peritoneum was incised in a  transverse fashion. Bladder flap was created using sharp dissection. The broad ligaments were transected. The retroperitoneal access was  obtained. The course of each ureter was identified and traced and  appeared as though each ureter would be free of the immediate operative  field. Clamps were placed across the infundibulopelvic ligaments. Pedicles  were cut and then ligated using ties of 0 Vicryl and stitches of 0  Vicryl. There were some filmy adhesions involving the bowel with the  posterior aspect of the uterus and cervix. These were taken down using  sharp dissection. In addition, I was able to see the appendix, it was  also attached to the posterior aspects of the uterus, again with filmy  adhesions and these were taken down using sharp dissection as well using  care to avoid any direct involvement with the bowel. Clamps were placed across the uterine vessels. Pedicles were cut and  then ligated using stitches of 0 Vicryl. This was continued in a  descending fashion along the branches of the uterine arteries for  several more bites using a similar technique of clamping, cutting, and  tying with stitches of 0 Vicryl. At this point, because of limited  visualization, because of the bulky uterus, the uterus was amputated off  the cervix using Bovie electrocautery.   It was sent off to be evaluated  by pathologist.    At this point, I was able to place a O'Herve-O'Miguel retractor. The  bowel was packed out of the way using moist lap sponges and the  retractor itself was elevated using rolled up blue towels. I continued  isolating the vessels, placing clamps across the cervical branches of  the uterine arteries. Pedicles were cut and then ligated using stitches  of 0 Vicryl. This was continued in a descending fashion along the  branches of the uterine arteries. Finally, once the bladder was  dissected well off the immediate operative field, clamps were placed  across the uterosacral and the cardinal ligament complex bilaterally. Pedicles were cut along with the cervicovaginal epithelium. The uterus, cervix, ovaries and tubes then were handed off to be  evaluated by pathologist.  Arian Drummer were placed at the corners of  vaginal cuff and incorporated into the uterosacral and cardinal ligament  complex. Finally, a stitch of 0 Vicryl was begun at the patient's right  uterosacral and cardinal ligament complex. It was then used to  reapproximate the anterior with the posterior aspects of the vaginal  cuff in a running locking fashion, finally ending with the contralateral  uterosacral and cardinal ligament complex. Copious irrigation was performed. All sites were inspected. Hemostasis  was confirmed. The course of each ureter was retraced and appeared as  though each ureter was unaffected by the surgery. The urine was clear  throughout the entire procedure. Bladder reflection was approximately 2  cm from the suture line. The self-retaining retractor was removed along  with the moist lap sponges. At this point, the peritoneum and the fascia were reapproximated using a  running stitch of looped 0 PDS using a Smead-Espinoza style closure going  from superior to inferior.   After this, the subcutaneous tissue was  reapproximated with a running stitch of 3-0 Vicryl and the skin was  closed with a subcuticular stitch of 4-0 Vicryl. Pressure dressing was  placed across the incision. At this point, because the instrument count  was off, an x-ray was performed of the patient's pelvis and the results  are pending. After the procedure, the patient was allowed to awaken from anesthesia. After which time, she was transferred from the operating room to the  recovery room where she went in stable condition. All sponge, lap and  needles were correct x2.         Pavithra Shah MD    D: 27/91/4165 11:01:50       T: 10/22/2020 11:10:11     [email or web address]  Job#: [id number]     Doc#: [id number]    CC:

## 2020-10-22 NOTE — H&P
Date of Surgery Update:  Beatris Huang was seen, history and physical examination reviewed, and patient examined by me today. There have been no significant clinical changes since the completion of the previous history and physical.    The risk, benefits, and alternatives of the proposed procedure have been explained to the patient (or appropriate guardian) and understanding verbalized. All questions answered. Patient wishes to proceed.     Electronically signed by: GEO Nathan,35/97/9297,3:52 AM

## 2020-10-22 NOTE — PROGRESS NOTES
4 Eyes Skin Assessment     NAME:  Jessica Mistry  YOB: 1970  MEDICAL RECORD NUMBER:  [id number]    The patient is being assess for  Admission    I agree that 2 RN's have performed a thorough Head to Toe Skin Assessment on the patient. ALL assessment sites listed below have been assessed. Areas assessed by both nurses:    Head, Face, Ears, Shoulders, Back, Chest, Arms, Elbows, Hands, Sacrum. Buttock, Coccyx, Ischium and Legs. Feet and Heels        Does the Patient have a Wound?  No noted wound(s)       Kevon Prevention initiated:  NA   Wound Care Orders initiated:  No    Pressure Injury (Stage 3,4, Unstageable, DTI, NWPT, and Complex wounds) if present place consult order under [de-identified] No    New and Established Ostomies if present place consult order under : No      Nurse 1 eSignature: Electronically signed by Katiuska Zamarripa RN on 10/22/20 at 5:04 PM EDT    **SHARE this note so that the co-signing nurse is able to place an eSignature**    Nurse 2 eSignature: Electronically signed by Emily Moran RN on 10/22/20 at 5:06 PM EDT

## 2020-10-22 NOTE — PROGRESS NOTES
PCA stated. Patient starting to sleep. 4L N/C. F/C output clear light yellow urine. Abdominal binder on. Mild abdominal discomfort likely gastritis/constipation  - Stool softner   - Optimal pain control  - No signs of colitis/diverticulitis on CT scan.  - Plan per primary team.

## 2020-10-22 NOTE — PROGRESS NOTES
Hand off report from [address]. Patient opens eyes to name. Resp easy unlabored on $L NC with SaO2 99%. Midline abdominal surgical dressing with shadow drainage to distal end. Abdominal binder intact. Selena pad intact with small old bloody drainage noted. Mesh pants intact. Urinary catheter intact patent to gravity drainage with clear yellow urine noted in drainage bag. Patient denies nausea. Taking ice chips prn. Abdominal surgical pain at 4-5 of 10 and tolerble. Resting more comfortably. Taking ice chips prn. Moving all extremities to command. IV patent to right AC. PCA intact and patient using as needed.

## 2020-10-22 NOTE — PROGRESS NOTES
Pt admitted to  [id number]. Pt drowsy, easily arousable to name. Pt has no complaints of pain at this time. Roberts intact, draining clear yellow urine. Abd surgical dressing in place, scant old drainage noted to distal end of dressing. Abd binder in place. Pt on 4L NC, O2 sat 94%. Pt c/o itching from cannula, asking when oxygen can be taken off. Pt educated on use of oxygen post surgery. PCA pump infusing and verified. Pt feeling need to cough, pillow given for abd support. Pt repositioned to left side for comfort. Assessment complete. VSS. Call light in reach. Will continue to monitor.

## 2020-10-22 NOTE — PROGRESS NOTES
Vaginal Sweep Documentation     Intraop skin prep sponge count correct, verified by *augusta* and *ida*. Vaginal sweep performed by *dr eckert** at *4524**. No foreign objects or vaginal tears noted.

## 2020-10-22 NOTE — BRIEF OP NOTE
Brief Postoperative Note      Patient: Thelma Fernandez  YOB: 1970  MRN: [id number]    Date of Procedure: 10/22/2020    Pre-Op Diagnosis: FIBROID UTERUS, CHRONIC ANEMIA    Post-Op Diagnosis: Same       Procedure(s):  TOTAL ABDOMINAL HYSTERECTOMY BILATERAL SALPINGO-OOPHORECTOMY VERTICAL SKIN INCISION    Surgeon(s):  Grazyna Ramos MD    Assistant:  Surgical Assistant: Emi Wilson    Anesthesia: General    Estimated Blood Loss (mL): 885 cc    Complications: None    Specimens:   ID Type Source Tests Collected by Time Destination   A : uterus, bilateral falopian tubes, bilateral ovaries Specimen Abdomen SURGICAL PATHOLOGY Grazyna Ramos MD 41/28/2979 8465        Implants:  * No implants in log *      Drains:   Urethral Catheter Latex 16 fr (Active)       Findings: grossly enlarged fibroid uterus    Electronically signed by Ernie Pineda MD on 47/29/4497 at 10:49 AM

## 2020-10-22 NOTE — ANESTHESIA PRE PROCEDURE
[facility] Department of Anesthesiology  Pre-Anesthesia Evaluation/Consultation       Name:  Pancho Pinon  : 1970  Age:  52 y.o. MRN:  [id number]  Date: 10/22/2020           Surgeon: Surgeon(s):  Anais Cowart MD    Procedure: Procedure(s):  TOTAL ABDOMINAL HYSTERECTOMY BILATERAL SALPINGO-OOPHORECTOMY VERTICAL SKIN INCISION     No Known Allergies  Patient Active Problem List   Diagnosis    Tibia/fibula fracture, right, closed, initial encounter    Closed fracture of right tibial plateau    S/P PILAR-BSO (total abdominal hysterectomy and bilateral salpingo-oophorectomy)    Submucous and subserous leiomyoma of uterus    Acute on chronic anemia     Past Medical History:   Diagnosis Date    Acute on chronic anemia     Fibroids     Irregular uterine bleeding      Past Surgical History:   Procedure Laterality Date    ORIF FEMUR DECOMPRESSION Right     ORIF Rt. tibial plateau fx.  WISDOM TOOTH EXTRACTION Bilateral     all of [place]     Social History     Tobacco Use    Smoking status: Current Every Day Smoker     Packs/day: 1.00     Years: 30.00     Pack years: 30.00     Types: Cigarettes    Smokeless tobacco: Never Used   Substance Use Topics    Alcohol use: Yes     Comment: occ    Drug use: No     Medications  No current facility-administered medications on file prior to encounter. No current outpatient medications on file prior to encounter.      Current Facility-Administered Medications   Medication Dose Route Frequency Provider Last Rate Last Dose    0.9 % sodium chloride infusion   Intravenous Continuous Shalini Paniagua  mL/hr at 10/22/20 0813      sodium chloride flush 0.9 % injection 10 mL  10 mL Intravenous 2 times per day Shalini Paniagua MD        sodium chloride flush 0.9 % injection 10 mL  10 mL Intravenous PRN Shalini Paniagua MD        sodium chloride 0.9 % irrigation    Continuous PRN Anais Cowart MD   2,231 mL at Encounters:   10/22/20 (!) 194/102   09/25/20 (!) 138/90   09/15/20 136/85       BMI  Body mass index is 23.93 kg/m². Estimated body mass index is 23.93 kg/m² as calculated from the following:    Height as of this encounter: 5' 8\" (1.727 m). Weight as of this encounter: 157 lb 6.5 oz (71.4 kg). CBC   Lab Results   Component Value Date    WBC 6.3 09/15/2020    RBC 3.99 09/15/2020    HGB 9.6 09/15/2020    HCT 30.6 09/15/2020    MCV 76.7 09/15/2020    RDW 21.1 09/15/2020     09/15/2020     CMP    Lab Results   Component Value Date     12/18/2017    K 3.6 12/18/2017    CL 99 12/18/2017    CO2 23 12/18/2017    BUN 12 12/18/2017    CREATININE 0.7 12/18/2017    GFRAA >60 12/18/2017    AGRATIO 1.0 12/15/2017    LABGLOM >60 12/18/2017    GLUCOSE 119 12/18/2017    PROT 6.6 12/15/2017    CALCIUM 8.9 12/18/2017    BILITOT 0.4 12/15/2017    ALKPHOS 75 12/15/2017    AST 24 12/15/2017    ALT 14 12/15/2017     BMP    Lab Results   Component Value Date     12/18/2017    K 3.6 12/18/2017    CL 99 12/18/2017    CO2 23 12/18/2017    BUN 12 12/18/2017    CREATININE 0.7 12/18/2017    CALCIUM 8.9 12/18/2017    GFRAA >60 12/18/2017    LABGLOM >60 12/18/2017    GLUCOSE 119 12/18/2017     POCGlucose  No results for input(s): GLUCOSE in the last 72 hours.    Coags  No results found for: PROTIME, INR, APTT  HCG (If Applicable)   Lab Results   Component Value Date    PREGTESTUR Negative 10/22/2020      ABGs No results found for: PHART, PO2ART, GOK9WTL, XDK2WLA, BEART, Z2QJIJIV   Type & Screen (If Applicable)  No results found for: LABABO, LABRH                         BMI: Wt Readings from Last 3 Encounters:       NPO Status:   Date of last liquid consumption: 10/22/20   Time of last liquid consumption: 0000   Date of last solid food consumption: 10/22/20      Time of last solid consumption: 0000       Anesthesia Evaluation  Patient summary reviewed no history of anesthetic complications:   Airway: Mallampati: II Dental:          Pulmonary:       (-) pneumonia                           Cardiovascular:Negative CV ROS                      Neuro/Psych:      (-) seizures and CVA           GI/Hepatic/Renal:        (-) no renal disease, bowel prep and no morbid obesity       Endo/Other:    (+) blood dyscrasia: anemia:., .    (-) diabetes mellitus               Abdominal:           Vascular: negative vascular ROS. Anesthesia Plan      general     ASA 2       Induction: intravenous. MIPS: Prophylactic antiemetics administered. Anesthetic plan and risks discussed with patient. Plan discussed with CRNA. Attending anesthesiologist reviewed and agrees with Pre Eval content              This pre-anesthesia assessment may be used as a history and physical.    DOS STAFF ADDENDUM:    Pt seen and examined, chart reviewed (including anesthesia, drug and allergy history). No interval changes to history and physical examination. Anesthetic plan, risks, benefits, alternatives, and personnel involved discussed with patient. Patient verbalized an understanding and agrees to proceed.       Rey Hendrix MD  October 22, 2020  11:59 AM

## 2020-10-22 NOTE — PROGRESS NOTES
Patient frequently pulling O2 off, patient educated on importance of leaving O2 on. Patient states pain level 4-5 of 10 and tolerable. VSS. IV patent. Patient stable to transfer to room 4132.

## 2020-10-22 NOTE — PROGRESS NOTES
Report called to Humera Wright RN receiving patient into room [id number]. Patient resting comfortably. VSS. IV patent. Abdominal surgical dressing unchanged.

## 2020-10-23 LAB
HCT VFR BLD CALC: 23.4 % (ref 36–48)
HEMOGLOBIN: 7 G/DL (ref 12–16)
MCH RBC QN AUTO: 22 PG (ref 26–34)
MCHC RBC AUTO-ENTMCNC: 30 G/DL (ref 31–36)
MCV RBC AUTO: 73.3 FL (ref 80–100)
PDW BLD-RTO: 18.6 % (ref 12.4–15.4)
PLATELET # BLD: 291 K/UL (ref 135–450)
PMV BLD AUTO: 9 FL (ref 5–10.5)
RBC # BLD: 3.2 M/UL (ref 4–5.2)
WBC # BLD: 8.3 K/UL (ref 4–11)

## 2020-10-23 PROCEDURE — 1200000000 HC SEMI PRIVATE

## 2020-10-23 PROCEDURE — 36415 COLL VENOUS BLD VENIPUNCTURE: CPT

## 2020-10-23 PROCEDURE — 2700000000 HC OXYGEN THERAPY PER DAY

## 2020-10-23 PROCEDURE — 2580000003 HC RX 258: Performed by: OBSTETRICS & GYNECOLOGY

## 2020-10-23 PROCEDURE — 6370000000 HC RX 637 (ALT 250 FOR IP): Performed by: OBSTETRICS & GYNECOLOGY

## 2020-10-23 PROCEDURE — 6360000002 HC RX W HCPCS: Performed by: OBSTETRICS & GYNECOLOGY

## 2020-10-23 PROCEDURE — 85027 COMPLETE CBC AUTOMATED: CPT

## 2020-10-23 RX ORDER — DIPHENHYDRAMINE HCL 25 MG
25 TABLET ORAL EVERY 6 HOURS PRN
Status: DISCONTINUED | OUTPATIENT
Start: 2020-10-23 | End: 2020-10-24 | Stop reason: HOSPADM

## 2020-10-23 RX ORDER — OXYCODONE HYDROCHLORIDE AND ACETAMINOPHEN 5; 325 MG/1; MG/1
1 TABLET ORAL EVERY 4 HOURS PRN
Status: DISCONTINUED | OUTPATIENT
Start: 2020-10-23 | End: 2020-10-24 | Stop reason: HOSPADM

## 2020-10-23 RX ADMIN — DIPHENHYDRAMINE HCL 25 MG: 25 TABLET ORAL at 07:55

## 2020-10-23 RX ADMIN — DOCUSATE SODIUM 100 MG: 100 CAPSULE, LIQUID FILLED ORAL at 19:55

## 2020-10-23 RX ADMIN — SODIUM CHLORIDE, POTASSIUM CHLORIDE, SODIUM LACTATE AND CALCIUM CHLORIDE: 600; 310; 30; 20 INJECTION, SOLUTION INTRAVENOUS at 00:02

## 2020-10-23 RX ADMIN — OXYCODONE HYDROCHLORIDE AND ACETAMINOPHEN 1 TABLET: 5; 325 TABLET ORAL at 19:56

## 2020-10-23 RX ADMIN — MORPHINE SULFATE 30 MG: 1 INJECTION INTRAVENOUS at 05:07

## 2020-10-23 RX ADMIN — Medication 10 ML: at 19:56

## 2020-10-23 RX ADMIN — DOCUSATE SODIUM 100 MG: 100 CAPSULE, LIQUID FILLED ORAL at 07:50

## 2020-10-23 RX ADMIN — OXYCODONE HYDROCHLORIDE AND ACETAMINOPHEN 1 TABLET: 5; 325 TABLET ORAL at 07:55

## 2020-10-23 RX ADMIN — PROMETHAZINE HYDROCHLORIDE 12.5 MG: 25 TABLET ORAL at 19:56

## 2020-10-23 ASSESSMENT — PAIN DESCRIPTION - ONSET
ONSET: ON-GOING
ONSET: ON-GOING

## 2020-10-23 ASSESSMENT — PAIN SCALES - GENERAL
PAINLEVEL_OUTOF10: 0
PAINLEVEL_OUTOF10: 5
PAINLEVEL_OUTOF10: 7
PAINLEVEL_OUTOF10: 7
PAINLEVEL_OUTOF10: 0

## 2020-10-23 ASSESSMENT — PAIN DESCRIPTION - DESCRIPTORS
DESCRIPTORS: CRAMPING
DESCRIPTORS: CRAMPING;DULL
DESCRIPTORS: ACHING;CRAMPING

## 2020-10-23 ASSESSMENT — PAIN DESCRIPTION - PROGRESSION
CLINICAL_PROGRESSION: GRADUALLY IMPROVING
CLINICAL_PROGRESSION: GRADUALLY IMPROVING

## 2020-10-23 ASSESSMENT — PAIN DESCRIPTION - PAIN TYPE
TYPE: ACUTE PAIN;SURGICAL PAIN
TYPE: ACUTE PAIN;SURGICAL PAIN
TYPE: SURGICAL PAIN

## 2020-10-23 ASSESSMENT — PAIN DESCRIPTION - ORIENTATION
ORIENTATION: MID;LOWER
ORIENTATION: LOWER;MID
ORIENTATION: LOWER;MID

## 2020-10-23 ASSESSMENT — PAIN DESCRIPTION - LOCATION
LOCATION: ABDOMEN

## 2020-10-23 ASSESSMENT — PAIN DESCRIPTION - FREQUENCY
FREQUENCY: CONTINUOUS
FREQUENCY: CONTINUOUS

## 2020-10-23 ASSESSMENT — PAIN - FUNCTIONAL ASSESSMENT
PAIN_FUNCTIONAL_ASSESSMENT: PREVENTS OR INTERFERES SOME ACTIVE ACTIVITIES AND ADLS
PAIN_FUNCTIONAL_ASSESSMENT: PREVENTS OR INTERFERES SOME ACTIVE ACTIVITIES AND ADLS

## 2020-10-23 NOTE — PROGRESS NOTES
Roberts cath Dc'd at this time. Pt tolerated well. Pt ambulated up to RR at this time. Stand by assist. Pt returned to bed with some complaints of nausea. Refused medication for nausea at this time. Pt sitting at side of bed with tray. Call light in reach will continue to monitor.

## 2020-10-23 NOTE — PLAN OF CARE
Problem: SAFETY  Goal: Free from accidental physical injury  10/23/2020 0012 by Jesus Alberto Goode RN  Outcome: Ongoing  10/22/2020 1554 by Alba Griggs RN  Outcome: Ongoing  10/22/2020 1514 by Alba Griggs RN  Outcome: Ongoing  Goal: Free from intentional harm  10/23/2020 0012 by Jesus Alberto Goode RN  Outcome: Ongoing  10/22/2020 1554 by Alba Griggs RN  Outcome: Ongoing  10/22/2020 1514 by Alba Griggs RN  Outcome: Ongoing     Problem: DAILY CARE  Goal: Daily care needs are met  10/23/2020 0012 by Jesus Alberto Goode RN  Outcome: Ongoing  10/22/2020 1554 by Alba Griggs RN  Outcome: Ongoing  10/22/2020 1514 by Alba Griggs RN  Outcome: Ongoing     Problem: PAIN  Goal: Patient's pain/discomfort is manageable  10/23/2020 0012 by Jesus Alberto Goode RN  Outcome: Ongoing  10/22/2020 1554 by Alba Griggs RN  Outcome: Ongoing  10/22/2020 1514 by Alba Griggs RN  Outcome: Ongoing     Problem: KNOWLEDGE DEFICIT  Goal: Patient/S.O. demonstrates understanding of disease process, treatment plan, medications, and discharge instructions.   10/23/2020 0012 by Jesus Alberto Goode RN  Outcome: Ongoing  10/22/2020 1554 by Alba Griggs RN  Outcome: Ongoing  10/22/2020 1514 by Alba Griggs RN  Outcome: Ongoing     Problem: DISCHARGE BARRIERS  Goal: Patient's continuum of care needs are met  10/23/2020 0012 by Jesus Alberto Goode RN  Outcome: Ongoing  10/22/2020 1554 by Alba Griggs RN  Outcome: Ongoing  10/22/2020 1514 by Alba Griggs RN  Outcome: Ongoing

## 2020-10-23 NOTE — PROGRESS NOTES
Wasted 29 ml morphine PCA pump with second RN.    Electronically signed by Anastasiia Martinez RN on 10/23/2020 at 3:06 PM   Electronically signed by Audrey Chavez RN on 10/23/2020 at 3:13 PM

## 2020-10-23 NOTE — PROGRESS NOTES
Ongoing PCA morphine consumed. Verified dosage, replaced and wasted 3 ml morphine witnessed by another RN.     Electronically signed by Diego Queen RN on 10/23/2020 at 5:16 AM     Electronically signed by Kevin Baker RN on 10/23/2020 at 5:20 AM

## 2020-10-24 VITALS
TEMPERATURE: 99.2 F | WEIGHT: 145.5 LBS | BODY MASS INDEX: 22.05 KG/M2 | OXYGEN SATURATION: 93 % | SYSTOLIC BLOOD PRESSURE: 157 MMHG | HEART RATE: 96 BPM | HEIGHT: 68 IN | DIASTOLIC BLOOD PRESSURE: 95 MMHG | RESPIRATION RATE: 16 BRPM

## 2020-10-24 PROCEDURE — 6370000000 HC RX 637 (ALT 250 FOR IP): Performed by: OBSTETRICS & GYNECOLOGY

## 2020-10-24 PROCEDURE — 2580000003 HC RX 258: Performed by: OBSTETRICS & GYNECOLOGY

## 2020-10-24 PROCEDURE — 94761 N-INVAS EAR/PLS OXIMETRY MLT: CPT

## 2020-10-24 RX ORDER — PROMETHAZINE HYDROCHLORIDE 12.5 MG/1
12.5 TABLET ORAL EVERY 6 HOURS PRN
Qty: 20 TABLET | Refills: 1 | Status: SHIPPED | OUTPATIENT
Start: 2020-10-24 | End: 2020-10-31

## 2020-10-24 RX ORDER — OXYCODONE HYDROCHLORIDE AND ACETAMINOPHEN 5; 325 MG/1; MG/1
1 TABLET ORAL EVERY 4 HOURS PRN
Qty: 40 TABLET | Refills: 0 | Status: SHIPPED | OUTPATIENT
Start: 2020-10-24 | End: 2020-10-31

## 2020-10-24 RX ADMIN — OXYCODONE HYDROCHLORIDE AND ACETAMINOPHEN 1 TABLET: 5; 325 TABLET ORAL at 00:33

## 2020-10-24 RX ADMIN — DOCUSATE SODIUM 100 MG: 100 CAPSULE, LIQUID FILLED ORAL at 09:56

## 2020-10-24 RX ADMIN — DIPHENHYDRAMINE HCL 25 MG: 25 TABLET ORAL at 00:33

## 2020-10-24 RX ADMIN — Medication 10 ML: at 09:56

## 2020-10-24 ASSESSMENT — PAIN DESCRIPTION - ORIENTATION: ORIENTATION: LOWER;MID

## 2020-10-24 ASSESSMENT — PAIN SCALES - GENERAL
PAINLEVEL_OUTOF10: 0
PAINLEVEL_OUTOF10: 0
PAINLEVEL_OUTOF10: 6

## 2020-10-24 ASSESSMENT — PAIN DESCRIPTION - PAIN TYPE: TYPE: ACUTE PAIN;SURGICAL PAIN

## 2020-10-24 ASSESSMENT — PAIN DESCRIPTION - DESCRIPTORS: DESCRIPTORS: ACHING;CRAMPING

## 2020-10-24 ASSESSMENT — PAIN DESCRIPTION - LOCATION: LOCATION: ABDOMEN

## 2020-10-24 NOTE — PROGRESS NOTES
Discharge orders received. Provided Pt with written and verbal discharge instructions. Pt verbalized understanding.

## 2020-10-24 NOTE — CARE COORDINATION
INITIAL CASE MANAGEMENT ASSESSMENT    Reviewed chart, met with patient to assess possible discharge needs. Explained Case Management role/services. SW interviewed patient via telephone today. Living Situation: Patient reports that she resides in a single family home with her boyfriend and no pets. There are four stairs leading up to the front door and he had no trouble getting in and out of the property at this time. ADLs: Prior to medical admission patient reports that she was independent. She stated that she doesn't anticipate any needs at discharge. DME: Prior to medical admission patient reports that she used no durable medical equipment. She stated that she doesn't anticipate any needs at discharge. PT/OT Recs: Since this morning admission patient reports that she was independent. She stated that she doesn't anticipate any needs at discharge. Active Services: Prior to medical admission patient reports that she used no active services. She stated that she doesn't anticipate any needs at discharge. Transportation: pt drives. Prior to medical admission patient reports that she was independent. She stated that her  will likely transport her home at discharge. Medications: Patient receives medications from [facility]. At this time there are no issues with access or affordability. PCP: No primary care provider on file. Patient stated that she received a list and will find a provider on her own. PLAN/COMMENTS:   1) Discharge to home with family. Patient doesn't appear to have any psychosocial needs at this time. If anything changes please contact this writer at the number listed below. SW provided contact information for patient or family to call with any questions. SW will follow and assist as needed. Respectfully submitted,    SHERINE Mendoza  [facility]   [phone]    Electronically signed by SHERINE Lloyd on 10/24/2020 at 10:40 AM

## 2020-10-24 NOTE — PROGRESS NOTES
pts doing well after surgery. She's ambulating. No n/v.  Passing gas. No bm.   Af, vss  abd- incision intact and no erythema, min tender  Ext- nt, no edema  H/h- 7/23.4  Assess:  POD 2 s/p tahbso  Plan:  D/c home, d/c instructions, rx percocet and phenergan, f/u one week

## 2020-10-24 NOTE — PLAN OF CARE
Problem: SAFETY  Goal: Free from accidental physical injury  10/23/2020 1514 by Yang Acosta RN  Outcome: Ongoing   Pt free from falls this shift. Fall precautions in place at all times. Call light always within reach. Pt able and agreeable to contact for safety appropriately. Problem: DAILY CARE  Goal: Daily care needs are met  10/24/2020 0158 by Analisa Mcgraw RN  Outcome: Ongoing   Assist with daily care needs. Problem: PAIN  Goal: Patient's pain/discomfort is manageable  10/23/2020 1514 by Yang Acosta RN  Outcome: Ongoing   Pain/discomfort being managed with PRN analgesics per MD orders. Pt able to express presence and absence of pain and rate pain appropriately using numerical scale. Problem: SKIN INTEGRITY  Goal: Skin integrity is maintained or improved  10/24/2020 0158 by Analisa Mcgraw RN  Outcome: Ongoing     Problem: KNOWLEDGE DEFICIT  Goal: Patient/S.O. demonstrates understanding of disease process, treatment plan, medications, and discharge instructions. 10/24/2020 0158 by Analisa Mcgraw RN  Outcome: Ongoing   Patient demonstrates an understanding of the disease process, treatments, and medications at this time.     Problem: DISCHARGE BARRIERS  Goal: Patient's continuum of care needs are met  10/23/2020 1514 by Yang Acosta RN  Outcome: Ongoing

## 2020-10-26 ENCOUNTER — TELEPHONE (OUTPATIENT)
Dept: GYNECOLOGY | Age: 50
End: 2020-10-26

## 2020-10-26 ENCOUNTER — TELEPHONE (OUTPATIENT)
Dept: FAMILY MEDICINE CLINIC | Age: 50
End: 2020-10-26

## 2020-10-26 NOTE — TELEPHONE ENCOUNTER
Patient is calling to speak to Valery Lloyd, she is calling about her pain medication.  Please call her at [phone]

## 2020-10-26 NOTE — TELEPHONE ENCOUNTER
Called pt and let her know it was nothing on our end for the hold up on med.  It is her insurance that denied it and I went thru cover my meds it was denied and now I am sending fax

## 2020-10-26 NOTE — TELEPHONE ENCOUNTER
Pt called stating that she could not get pain med filled from surgery on Thursday. so I called her pharmacy and they stated they needed her insurance # I gave them that and they stated she her pheragen was approved but she needed a pa on pain med so I started pa and I am waiting for response . I called pt to let her know what was going on and told her I would call her as soon as med approved or I heard anything.

## 2020-10-30 ENCOUNTER — OFFICE VISIT (OUTPATIENT)
Dept: GYNECOLOGY | Age: 50
End: 2020-10-30

## 2020-10-30 VITALS
DIASTOLIC BLOOD PRESSURE: 92 MMHG | HEIGHT: 68 IN | WEIGHT: 151 LBS | TEMPERATURE: 97.6 F | BODY MASS INDEX: 22.88 KG/M2 | SYSTOLIC BLOOD PRESSURE: 160 MMHG | HEART RATE: 106 BPM

## 2020-10-30 PROCEDURE — 99024 POSTOP FOLLOW-UP VISIT: CPT | Performed by: OBSTETRICS & GYNECOLOGY

## 2020-10-30 NOTE — PROGRESS NOTES
pts doing well after surgery. Hasn't been able to have her pain meds filled by pharmacy despite being told by [employer] that it was approved. Normal bowels. Ambulating. No n/v. Af, vss  abd- soft, min tender, incision intact and no erythema  Ext- nt, no edema  Assess:  Normal post op state  Plan:  F/u one month.

## 2020-11-11 NOTE — DISCHARGE SUMMARY
She was passing gas. She was tolerating a regular diet. She was able to be discharged home. She was given discharge instructions. She was given a prescription for  Percocet and Phenergan. She was to follow up in the office in one week.         Randy Bravo MD    D: 81/89/7405 15:06:57       T: 11/10/2020 15:11:50     [email or web address]  Job#: [id number]     Doc#: [id number]    CC:

## 2020-11-25 ENCOUNTER — OFFICE VISIT (OUTPATIENT)
Dept: GYNECOLOGY | Age: 50
End: 2020-11-25
Payer: COMMERCIAL

## 2020-11-25 VITALS
DIASTOLIC BLOOD PRESSURE: 84 MMHG | HEART RATE: 87 BPM | SYSTOLIC BLOOD PRESSURE: 145 MMHG | TEMPERATURE: 97.7 F | HEIGHT: 68 IN | WEIGHT: 144 LBS | BODY MASS INDEX: 21.82 KG/M2

## 2020-11-25 LAB
BILIRUBIN, POC: ABNORMAL
BLOOD URINE, POC: ABNORMAL
CLARITY, POC: ABNORMAL
COLOR, POC: ABNORMAL
GLUCOSE URINE, POC: ABNORMAL
KETONES, POC: 15
LEUKOCYTE EST, POC: ABNORMAL
NITRITE, POC: ABNORMAL
PH, POC: 6
PROTEIN, POC: 3
SPECIFIC GRAVITY, POC: >=1.03
UROBILINOGEN, POC: 1

## 2020-11-25 PROCEDURE — 81002 URINALYSIS NONAUTO W/O SCOPE: CPT | Performed by: OBSTETRICS & GYNECOLOGY

## 2020-11-25 PROCEDURE — 99024 POSTOP FOLLOW-UP VISIT: CPT | Performed by: OBSTETRICS & GYNECOLOGY

## 2020-11-25 NOTE — PROGRESS NOTES
pts doing well after surgery. She is planning to fix Thanksgiving dinner tomorrow. No bleeding. Notes that her urine smells strong. Af, vss  abd- incision intact and no erythema, healing well  Vag- cuff healing well, no granulation tissue  Assess:  Normal post op state  Plan:  F/u annual gyn exam.  Check urine.

## 2021-03-22 ENCOUNTER — HOSPITAL ENCOUNTER (INPATIENT)
Age: 51
LOS: 2 days | Discharge: HOME | DRG: 047 | End: 2021-03-24
Attending: STUDENT IN AN ORGANIZED HEALTH CARE EDUCATION/TRAINING PROGRAM | Admitting: STUDENT IN AN ORGANIZED HEALTH CARE EDUCATION/TRAINING PROGRAM
Payer: COMMERCIAL

## 2021-03-22 ENCOUNTER — APPOINTMENT (OUTPATIENT)
Dept: CT IMAGING | Age: 51
DRG: 047 | End: 2021-03-22
Payer: COMMERCIAL

## 2021-03-22 ENCOUNTER — APPOINTMENT (OUTPATIENT)
Dept: GENERAL RADIOLOGY | Age: 51
DRG: 047 | End: 2021-03-22
Payer: COMMERCIAL

## 2021-03-22 DIAGNOSIS — R53.1 GENERAL WEAKNESS: ICD-10-CM

## 2021-03-22 DIAGNOSIS — R00.1 BRADYCARDIA: ICD-10-CM

## 2021-03-22 DIAGNOSIS — R42 DIZZINESS: ICD-10-CM

## 2021-03-22 DIAGNOSIS — N39.0 URINARY TRACT INFECTION WITHOUT HEMATURIA, SITE UNSPECIFIED: ICD-10-CM

## 2021-03-22 DIAGNOSIS — R41.82 ALTERED MENTAL STATUS, UNSPECIFIED ALTERED MENTAL STATUS TYPE: Primary | ICD-10-CM

## 2021-03-22 LAB
A/G RATIO: 1.1 (ref 1.1–2.2)
ALBUMIN SERPL-MCNC: 3.8 G/DL (ref 3.4–5)
ALP BLD-CCNC: 99 U/L (ref 40–129)
ALT SERPL-CCNC: 15 U/L (ref 10–40)
AMPHETAMINE SCREEN, URINE: ABNORMAL
ANION GAP SERPL CALCULATED.3IONS-SCNC: 12 MMOL/L (ref 3–16)
APTT: 30.3 SEC (ref 24.2–36.2)
AST SERPL-CCNC: 18 U/L (ref 15–37)
BACTERIA: ABNORMAL /HPF
BARBITURATE SCREEN URINE: ABNORMAL
BASOPHILS ABSOLUTE: 0.1 K/UL (ref 0–0.2)
BASOPHILS RELATIVE PERCENT: 1.2 %
BENZODIAZEPINE SCREEN, URINE: ABNORMAL
BILIRUB SERPL-MCNC: 0.6 MG/DL (ref 0–1)
BILIRUBIN URINE: NEGATIVE
BLOOD, URINE: NEGATIVE
BUN BLDV-MCNC: 11 MG/DL (ref 7–20)
CALCIUM SERPL-MCNC: 9.2 MG/DL (ref 8.3–10.6)
CANNABINOID SCREEN URINE: POSITIVE
CHLORIDE BLD-SCNC: 105 MMOL/L (ref 99–110)
CHP ED QC CHECK: YES
CLARITY: CLEAR
CO2: 24 MMOL/L (ref 21–32)
COCAINE METABOLITE SCREEN URINE: ABNORMAL
COLOR: YELLOW
CREAT SERPL-MCNC: 0.7 MG/DL (ref 0.6–1.1)
EOSINOPHILS ABSOLUTE: 0.1 K/UL (ref 0–0.6)
EOSINOPHILS RELATIVE PERCENT: 1.3 %
EPITHELIAL CELLS, UA: 1 /HPF (ref 0–5)
ETHANOL: NORMAL MG/DL (ref 0–0.08)
GFR AFRICAN AMERICAN: >60
GFR NON-AFRICAN AMERICAN: >60
GLOBULIN: 3.6 G/DL
GLUCOSE BLD-MCNC: 102 MG/DL (ref 70–99)
GLUCOSE BLD-MCNC: 89 MG/DL
GLUCOSE BLD-MCNC: 89 MG/DL (ref 70–99)
GLUCOSE URINE: NEGATIVE MG/DL
HCT VFR BLD CALC: 41 % (ref 36–48)
HEMOGLOBIN: 13.5 G/DL (ref 12–16)
HYALINE CASTS: 0 /LPF (ref 0–8)
INR BLD: 1.03 (ref 0.86–1.14)
KETONES, URINE: NEGATIVE MG/DL
LACTIC ACID, SEPSIS: 1 MMOL/L (ref 0.4–1.9)
LACTIC ACID, SEPSIS: 1.3 MMOL/L (ref 0.4–1.9)
LEUKOCYTE ESTERASE, URINE: NEGATIVE
LYMPHOCYTES ABSOLUTE: 1.2 K/UL (ref 1–5.1)
LYMPHOCYTES RELATIVE PERCENT: 19.3 %
Lab: ABNORMAL
MAGNESIUM: 1.7 MG/DL (ref 1.8–2.4)
MCH RBC QN AUTO: 28.8 PG (ref 26–34)
MCHC RBC AUTO-ENTMCNC: 33 G/DL (ref 31–36)
MCV RBC AUTO: 87.4 FL (ref 80–100)
METHADONE SCREEN, URINE: ABNORMAL
MICROSCOPIC EXAMINATION: YES
MONOCYTES ABSOLUTE: 0.5 K/UL (ref 0–1.3)
MONOCYTES RELATIVE PERCENT: 7.9 %
NEUTROPHILS ABSOLUTE: 4.4 K/UL (ref 1.7–7.7)
NEUTROPHILS RELATIVE PERCENT: 70.3 %
NITRITE, URINE: POSITIVE
OPIATE SCREEN URINE: ABNORMAL
OXYCODONE URINE: ABNORMAL
PDW BLD-RTO: 17.8 % (ref 12.4–15.4)
PERFORMED ON: NORMAL
PH UA: 8.5
PH UA: 8.5 (ref 5–8)
PHENCYCLIDINE SCREEN URINE: ABNORMAL
PLATELET # BLD: 209 K/UL (ref 135–450)
PMV BLD AUTO: 9.8 FL (ref 5–10.5)
POTASSIUM REFLEX MAGNESIUM: 3.5 MMOL/L (ref 3.5–5.1)
PROPOXYPHENE SCREEN: ABNORMAL
PROTEIN UA: NEGATIVE MG/DL
PROTHROMBIN TIME: 12 SEC (ref 10–13.2)
RBC # BLD: 4.69 M/UL (ref 4–5.2)
RBC UA: 1 /HPF (ref 0–4)
SODIUM BLD-SCNC: 141 MMOL/L (ref 136–145)
SPECIFIC GRAVITY UA: >1.03 (ref 1–1.03)
TOTAL PROTEIN: 7.4 G/DL (ref 6.4–8.2)
TROPONIN: <0.01 NG/ML
URINE REFLEX TO CULTURE: ABNORMAL
URINE TYPE: ABNORMAL
UROBILINOGEN, URINE: 0.2 E.U./DL
WBC # BLD: 6.2 K/UL (ref 4–11)
WBC UA: 0 /HPF (ref 0–5)

## 2021-03-22 PROCEDURE — 83735 ASSAY OF MAGNESIUM: CPT

## 2021-03-22 PROCEDURE — 93005 ELECTROCARDIOGRAM TRACING: CPT | Performed by: GENERAL ACUTE CARE HOSPITAL

## 2021-03-22 PROCEDURE — 99285 EMERGENCY DEPT VISIT HI MDM: CPT

## 2021-03-22 PROCEDURE — 70498 CT ANGIOGRAPHY NECK: CPT

## 2021-03-22 PROCEDURE — 2060000000 HC ICU INTERMEDIATE R&B

## 2021-03-22 PROCEDURE — 96361 HYDRATE IV INFUSION ADD-ON: CPT

## 2021-03-22 PROCEDURE — 2580000003 HC RX 258: Performed by: NURSE PRACTITIONER

## 2021-03-22 PROCEDURE — 6360000002 HC RX W HCPCS: Performed by: NURSE PRACTITIONER

## 2021-03-22 PROCEDURE — 70450 CT HEAD/BRAIN W/O DYE: CPT

## 2021-03-22 PROCEDURE — 36415 COLL VENOUS BLD VENIPUNCTURE: CPT

## 2021-03-22 PROCEDURE — 6370000000 HC RX 637 (ALT 250 FOR IP): Performed by: GENERAL ACUTE CARE HOSPITAL

## 2021-03-22 PROCEDURE — 6370000000 HC RX 637 (ALT 250 FOR IP): Performed by: NURSE PRACTITIONER

## 2021-03-22 PROCEDURE — 83605 ASSAY OF LACTIC ACID: CPT

## 2021-03-22 PROCEDURE — 96374 THER/PROPH/DIAG INJ IV PUSH: CPT

## 2021-03-22 PROCEDURE — 81001 URINALYSIS AUTO W/SCOPE: CPT

## 2021-03-22 PROCEDURE — 2580000003 HC RX 258: Performed by: GENERAL ACUTE CARE HOSPITAL

## 2021-03-22 PROCEDURE — 85025 COMPLETE CBC W/AUTO DIFF WBC: CPT

## 2021-03-22 PROCEDURE — 6360000002 HC RX W HCPCS: Performed by: GENERAL ACUTE CARE HOSPITAL

## 2021-03-22 PROCEDURE — 84484 ASSAY OF TROPONIN QUANT: CPT

## 2021-03-22 PROCEDURE — 82077 ASSAY SPEC XCP UR&BREATH IA: CPT

## 2021-03-22 PROCEDURE — 80053 COMPREHEN METABOLIC PANEL: CPT

## 2021-03-22 PROCEDURE — 6360000004 HC RX CONTRAST MEDICATION: Performed by: GENERAL ACUTE CARE HOSPITAL

## 2021-03-22 PROCEDURE — 85730 THROMBOPLASTIN TIME PARTIAL: CPT

## 2021-03-22 PROCEDURE — 80307 DRUG TEST PRSMV CHEM ANLYZR: CPT

## 2021-03-22 PROCEDURE — 71045 X-RAY EXAM CHEST 1 VIEW: CPT

## 2021-03-22 PROCEDURE — 85610 PROTHROMBIN TIME: CPT

## 2021-03-22 RX ORDER — SODIUM CHLORIDE 0.9 % (FLUSH) 0.9 %
10 SYRINGE (ML) INJECTION PRN
Status: DISCONTINUED | OUTPATIENT
Start: 2021-03-22 | End: 2021-03-24 | Stop reason: HOSPADM

## 2021-03-22 RX ORDER — NICOTINE 21 MG/24HR
1 PATCH, TRANSDERMAL 24 HOURS TRANSDERMAL DAILY
Status: DISCONTINUED | OUTPATIENT
Start: 2021-03-23 | End: 2021-03-24 | Stop reason: HOSPADM

## 2021-03-22 RX ORDER — ONDANSETRON 2 MG/ML
4 INJECTION INTRAMUSCULAR; INTRAVENOUS ONCE
Status: COMPLETED | OUTPATIENT
Start: 2021-03-22 | End: 2021-03-22

## 2021-03-22 RX ORDER — ASPIRIN 300 MG/1
300 SUPPOSITORY RECTAL DAILY
Status: DISCONTINUED | OUTPATIENT
Start: 2021-03-23 | End: 2021-03-24 | Stop reason: HOSPADM

## 2021-03-22 RX ORDER — SODIUM CHLORIDE 0.9 % (FLUSH) 0.9 %
10 SYRINGE (ML) INJECTION EVERY 12 HOURS SCHEDULED
Status: DISCONTINUED | OUTPATIENT
Start: 2021-03-22 | End: 2021-03-24 | Stop reason: HOSPADM

## 2021-03-22 RX ORDER — DIPHENHYDRAMINE HYDROCHLORIDE 50 MG/ML
12.5 INJECTION INTRAMUSCULAR; INTRAVENOUS ONCE
Status: COMPLETED | OUTPATIENT
Start: 2021-03-22 | End: 2021-03-22

## 2021-03-22 RX ORDER — MAGNESIUM SULFATE 1 G/100ML
1000 INJECTION INTRAVENOUS PRN
Status: DISCONTINUED | OUTPATIENT
Start: 2021-03-22 | End: 2021-03-24 | Stop reason: HOSPADM

## 2021-03-22 RX ORDER — LABETALOL HYDROCHLORIDE 5 MG/ML
10 INJECTION, SOLUTION INTRAVENOUS EVERY 6 HOURS PRN
Status: DISCONTINUED | OUTPATIENT
Start: 2021-03-22 | End: 2021-03-24 | Stop reason: HOSPADM

## 2021-03-22 RX ORDER — ASPIRIN 81 MG/1
324 TABLET, CHEWABLE ORAL ONCE
Status: COMPLETED | OUTPATIENT
Start: 2021-03-22 | End: 2021-03-22

## 2021-03-22 RX ORDER — PROMETHAZINE HYDROCHLORIDE 25 MG/1
12.5 TABLET ORAL EVERY 6 HOURS PRN
Status: DISCONTINUED | OUTPATIENT
Start: 2021-03-22 | End: 2021-03-24 | Stop reason: HOSPADM

## 2021-03-22 RX ORDER — POLYETHYLENE GLYCOL 3350 17 G/17G
17 POWDER, FOR SOLUTION ORAL DAILY PRN
Status: DISCONTINUED | OUTPATIENT
Start: 2021-03-22 | End: 2021-03-24 | Stop reason: HOSPADM

## 2021-03-22 RX ORDER — ROSUVASTATIN CALCIUM 40 MG/1
40 TABLET, COATED ORAL NIGHTLY
Status: DISCONTINUED | OUTPATIENT
Start: 2021-03-22 | End: 2021-03-24 | Stop reason: HOSPADM

## 2021-03-22 RX ORDER — MAGNESIUM SULFATE 1 G/100ML
1000 INJECTION INTRAVENOUS ONCE
Status: DISCONTINUED | OUTPATIENT
Start: 2021-03-22 | End: 2021-03-22 | Stop reason: CLARIF

## 2021-03-22 RX ORDER — ONDANSETRON 2 MG/ML
4 INJECTION INTRAMUSCULAR; INTRAVENOUS EVERY 6 HOURS PRN
Status: DISCONTINUED | OUTPATIENT
Start: 2021-03-22 | End: 2021-03-24 | Stop reason: HOSPADM

## 2021-03-22 RX ORDER — 0.9 % SODIUM CHLORIDE 0.9 %
1000 INTRAVENOUS SOLUTION INTRAVENOUS ONCE
Status: COMPLETED | OUTPATIENT
Start: 2021-03-22 | End: 2021-03-22

## 2021-03-22 RX ORDER — METOCLOPRAMIDE HYDROCHLORIDE 5 MG/ML
10 INJECTION INTRAMUSCULAR; INTRAVENOUS ONCE
Status: COMPLETED | OUTPATIENT
Start: 2021-03-22 | End: 2021-03-22

## 2021-03-22 RX ORDER — BUTALBITAL, ACETAMINOPHEN AND CAFFEINE 50; 325; 40 MG/1; MG/1; MG/1
1 TABLET ORAL EVERY 4 HOURS PRN
Status: DISCONTINUED | OUTPATIENT
Start: 2021-03-22 | End: 2021-03-24 | Stop reason: HOSPADM

## 2021-03-22 RX ORDER — ASPIRIN 81 MG/1
81 TABLET ORAL DAILY
Status: DISCONTINUED | OUTPATIENT
Start: 2021-03-23 | End: 2021-03-24 | Stop reason: HOSPADM

## 2021-03-22 RX ORDER — KETOROLAC TROMETHAMINE 30 MG/ML
15 INJECTION, SOLUTION INTRAMUSCULAR; INTRAVENOUS ONCE
Status: COMPLETED | OUTPATIENT
Start: 2021-03-22 | End: 2021-03-22

## 2021-03-22 RX ADMIN — ONDANSETRON 4 MG: 2 INJECTION INTRAMUSCULAR; INTRAVENOUS at 18:04

## 2021-03-22 RX ADMIN — IOPAMIDOL 75 ML: 755 INJECTION, SOLUTION INTRAVENOUS at 16:57

## 2021-03-22 RX ADMIN — CEFTRIAXONE 1000 MG: 1 INJECTION, POWDER, FOR SOLUTION INTRAMUSCULAR; INTRAVENOUS at 19:46

## 2021-03-22 RX ADMIN — SODIUM CHLORIDE, PRESERVATIVE FREE 10 ML: 5 INJECTION INTRAVENOUS at 22:14

## 2021-03-22 RX ADMIN — SODIUM CHLORIDE 1000 ML: 9 INJECTION, SOLUTION INTRAVENOUS at 18:04

## 2021-03-22 RX ADMIN — ASPIRIN 81 MG 324 MG: 81 TABLET ORAL at 19:46

## 2021-03-22 RX ADMIN — MAGNESIUM SULFATE HEPTAHYDRATE: 500 INJECTION, SOLUTION INTRAMUSCULAR; INTRAVENOUS at 22:08

## 2021-03-22 RX ADMIN — DIPHENHYDRAMINE HYDROCHLORIDE 12.5 MG: 50 INJECTION, SOLUTION INTRAMUSCULAR; INTRAVENOUS at 21:44

## 2021-03-22 RX ADMIN — METOCLOPRAMIDE 10 MG: 5 INJECTION, SOLUTION INTRAMUSCULAR; INTRAVENOUS at 21:45

## 2021-03-22 RX ADMIN — ONDANSETRON 4 MG: 2 INJECTION INTRAMUSCULAR; INTRAVENOUS at 20:18

## 2021-03-22 RX ADMIN — ROSUVASTATIN CALCIUM 40 MG: 40 TABLET, FILM COATED ORAL at 22:21

## 2021-03-22 RX ADMIN — KETOROLAC TROMETHAMINE 15 MG: 30 INJECTION, SOLUTION INTRAMUSCULAR at 21:44

## 2021-03-22 ASSESSMENT — PAIN SCALES - GENERAL
PAINLEVEL_OUTOF10: 0
PAINLEVEL_OUTOF10: 0

## 2021-03-22 NOTE — PROGRESS NOTES
Medication Reconciliation    List of medications patient is currently taking is complete. Source of information: 1. Conversation with patient at bedside                                      2. EPIC records      Allergies  Patient has no known allergies. Notes regarding home medications:   1. Patient denies use of any prescription/OTC medications.     Elgin Martin PharmD, BCPS  3/22/2021 5:55 PM

## 2021-03-22 NOTE — ED NOTES
Bed: A15  Expected date: 3/22/21  Expected time: 4:23 PM  Means of arrival: [facility]  Comments:  [address] [place] [address] Way, RN  03/22/21 6157

## 2021-03-22 NOTE — ED PROVIDER NOTES
[address]        Pt Name: Harsh Murray  MRN: [id number]  [place] 1970  Date of evaluation: 3/22/2021  Provider: ANIBAL Trinh CNP  PCP: No primary care provider on file. BENJAMIN. I have evaluated this patient. My supervising physician was available for consultation. The total critical care time spent while evaluating and treating this patient was at least 37 minutes. This excludes time spent doing separately billable procedures. This includes time at the bedside, data interpretation, medication management, obtaining critical history from collateral sources if the patient is unable to provide it directly, and physician consultation. Specifics of interventions taken and potentially life-threatening diagnostic considerations are listed above in the medical decision making. CHIEF COMPLAINT       Chief Complaint   Patient presents with    Altered Mental Status       HISTORY OF PRESENT ILLNESS   (Location, Timing/Onset, Context/Setting, Quality, Duration, Modifying Factors, Severity, Associated Signs and Symptoms)  Note limiting factors. Harsh Murray is a 48 y.o. female who presents to the emergency department today for evaluation of altered mental status. History provided by patient's significant other. Patient found her significant other while in the parking lot at [address] stating that she had a sudden onset of severe dizziness and generalized weakness. Significant other states that patient sounded \"confused and tired\" on the phone so he contacted EMS. Patient is otherwise healthy without any chronic medical conditions. Spouse states that patient was \"fine\" this morning. There is been no recent travel or known sick contacts. Nursing Notes were all reviewed and agreed with or any disagreements were addressed in the HPI.     REVIEW OF SYSTEMS    (2-9 systems for level 4, 10 or more for level 5)     Review of Systems Unable to perform ROS: Mental status change       Positives and Pertinent negatives as per HPI. Except as noted above in the ROS, all other systems were reviewed and negative. PAST MEDICAL HISTORY     Past Medical History:   Diagnosis Date    Acute on chronic anemia     Fibroids     Irregular uterine bleeding          SURGICAL HISTORY     Past Surgical History:   Procedure Laterality Date    ORIF FEMUR DECOMPRESSION Right     ORIF Rt. tibial plateau fx.  PILAR AND BSO Bilateral 10/22/2020    TOTAL ABDOMINAL HYSTERECTOMY BILATERAL SALPINGO-OOPHORECTOMY VERTICAL SKIN INCISION performed by Adilene Mott MD at [facility] Bilateral     all of [facility]         [address]       Current Discharge Medication List            ALLERGIES     Patient has no known allergies. FAMILYHISTORY       Family History   Problem Relation Age of Onset    Rheum Arthritis Neg Hx     Osteoarthritis Neg Hx     Asthma Neg Hx     Breast Cancer Neg Hx     Cancer Neg Hx     Diabetes Neg Hx     Heart Failure Neg Hx     High Cholesterol Neg Hx     Hypertension Neg Hx     Migraines Neg Hx     Ovarian Cancer Neg Hx     Rashes/Skin Problems Neg Hx     Seizures Neg Hx     Stroke Neg Hx     Thyroid Disease Neg Hx           SOCIAL HISTORY       Social History     Tobacco Use    Smoking status: Current Every Day Smoker     Packs/day: 1.00     Years: 30.00     Pack years: 30.00     Types: Cigarettes    Smokeless tobacco: Never Used   Substance Use Topics    Alcohol use: Yes     Comment: occ    Drug use: No       SCREENINGS   NIH Stroke Scale  NIH Stroke Scale Assessed: Yes  Interval: Baseline  Level of Consciousness (1a. ): Alert  LOC Questions (1b. ):  Answers both correctly  LOC Commands (1c. ): Performs both tasks correctly  Best Gaze (2. ): Normal  Visual (3. ): No visual loss  Facial Palsy (4. ): Normal symmetrical movement  Motor Arm, Left (5a. ): No drift  Motor Arm, Right (5b. ): GCS: GCS eye subscore is 4. GCS verbal subscore is 4. GCS motor subscore is 6. Cranial Nerves: Dysarthria present. No cranial nerve deficit or facial asymmetry. Sensory: Sensation is intact. Motor: Weakness present. No tremor, abnormal muscle tone or pronator drift. Coordination: Coordination is intact.          DIAGNOSTIC RESULTS   LABS:    Labs Reviewed   CBC WITH AUTO DIFFERENTIAL - Abnormal; Notable for the following components:       Result Value    RDW 17.8 (*)     All other components within normal limits    Narrative:     Performed at:  [address] [facility]   Phone [phone]   COMPREHENSIVE METABOLIC PANEL W/ REFLEX TO MG FOR LOW K - Abnormal; Notable for the following components:    Glucose 102 (*)     All other components within normal limits    Narrative:     Performed at:  [address] [facility]   Phone [phone]   [address] - Abnormal; Notable for the following components:    Cannabinoid Scrn, Ur POSITIVE (*)     All other components within normal limits    Narrative:     Performed at:  [address] [facility]   Phone [phone]   URINE RT REFLEX TO CULTURE - Abnormal; Notable for the following components:    pH, UA 8.5 (*)     Nitrite, Urine POSITIVE (*)     All other components within normal limits    Narrative:     Performed at:  [address] [facility]   Phone [phone]   MAGNESIUM - Abnormal; Notable for the following components:    Magnesium 1.70 (*)     All other components within normal limits    Narrative:     Performed at:  [address] [facility]   Phone [phone]   MICROSCOPIC URINALYSIS - Abnormal; Notable for the following components:    Bacteria, UA 4+ (*)     All other components within normal limits    Narrative:     Performed at:  [facility]  [address] [facility] [address] [place] [address]   Phone [phone]   POCT GLUCOSE - Normal   TROPONIN    Narrative:     Performed at:  [facility]  [address] [facility] [address] [place] [address]   Phone [phone]   PROTIME-INR    Narrative:     Performed at:  [facility]  [address] [facility] [address] [place] [address]   Phone [phone]   APTT    Narrative:     Performed at:  [facility]  [address] [facility] [address] [place] [address]   Phone [phone]   LACTATE, SEPSIS    Narrative:     Performed at:  [facility]  [address] [facility] [address] [place] [address]   Phone [phone]   ETHANOL    Narrative:     Performed at:  [facility]  [address] [facility] [address] [place] [address]   Phone [phone]   LACTATE, SEPSIS   CBC   HEMOGLOBIN A1C   LIPID PANEL   TROPONIN   TROPONIN   POCT GLUCOSE    Narrative:     Performed at:  [facility]  [address] [facility] [address] [place] [address]   Phone [phone]       All other labs were within normal range or not returned as of this dictation. EKG: All EKG's are interpreted by the Emergency Department Physician in the absence of a cardiologist.  Please see their note for interpretation of EKG. RADIOLOGY:   Non-plain film images such as CT, Ultrasound and MRI are read by the radiologist. Plain radiographic images are visualized and preliminarily interpreted by the ED Provider with the below findings:        Interpretation per the Radiologist below, if available at the time of this note:    CT HEAD WO CONTRAST   Final Result   No acute intracranial abnormality.       Encephalomalacia in the left frontal lobe, consistent with remote infarction   or injury. Mild degree of scattered low-attenuation in the cerebral white matter,   consistent with small vessel disease. Critical results were called by Dr. Jimenez Ascencio MD to Solomon Bach on   3/22/2021 at 17:18. CTA HEAD NECK W CONTRAST   Final Result   No hemodynamically significant stenosis involving the head and neck arteries. Redemonstrated mild encephalomalacia changes in the left frontal lobe   indicative of an old insult. XR CHEST PORTABLE   Final Result   1. No active pulmonary disease. MRI brain with and without contrast    (Results Pending)     No results found.         PROCEDURES   Unless otherwise noted below, none     Procedures    CRITICAL CARE TIME   N/A    CONSULTS:  IP CONSULT TO PHARMACY  PHARMACY TO CHANGE BASE FLUIDS  IP CONSULT TO HOSPITALIST      EMERGENCY DEPARTMENT COURSE and DIFFERENTIAL DIAGNOSIS/MDM:   Vitals:    Vitals:    03/22/21 1941 03/22/21 1946 03/22/21 2039 03/22/21 2204   BP: (!) 167/79  (!) 160/78 (!) 166/78   Pulse: 59 80 63 76   Resp: 18 20 19 18   Temp: 97.6 °F (36.4 °C)   98.9 °F (37.2 °C)   TempSrc: Oral   Oral   SpO2:    93%       Patient was given the following medications:  Medications   sodium chloride flush 0.9 % injection 10 mL (10 mLs Intravenous Given 3/22/21 2214)   sodium chloride flush 0.9 % injection 10 mL (has no administration in time range)   promethazine (PHENERGAN) tablet 12.5 mg (has no administration in time range)     Or   ondansetron (ZOFRAN) injection 4 mg (has no administration in time range)   polyethylene glycol (GLYCOLAX) packet 17 g (has no administration in time range)   enoxaparin (LOVENOX) injection 40 mg (has no administration in time range)   aspirin EC tablet 81 mg (has no administration in time range)     Or   aspirin suppository 300 mg (has no administration in time range)   perflutren lipid microspheres (DEFINITY) injection 1.65 mg (has no administration in time range)   rosuvastatin (CRESTOR) tablet 40 mg (40 mg Oral Given 3/22/21 2221)   nicotine (NICODERM CQ) 14 MG/24HR 1 patch (has no administration in time range)   magnesium sulfate 1000 mg in dextrose 5% 100 mL IVPB (has no administration in time range)   butalbital-acetaminophen-caffeine (FIORICET, ESGIC) per tablet 1 tablet (has no administration in time range)   magnesium sulfate 1,000 mg in sodium chloride 0.9 % 100 mL IVPB ( Intravenous New Bag 3/22/21 2208)   labetalol (NORMODYNE;TRANDATE) injection 10 mg (has no administration in time range)   0.9 % sodium chloride bolus (0 mLs Intravenous Stopped 3/22/21 1945)   ondansetron (ZOFRAN) injection 4 mg (4 mg Intravenous Given 3/22/21 1804)   iopamidol (ISOVUE-370) 76 % injection 75 mL (75 mLs Intravenous Given 3/22/21 1657)   cefTRIAXone (ROCEPHIN) 1000 mg IVPB in 50 mL D5W minibag (0 mg Intravenous Stopped 3/22/21 2018)   aspirin chewable tablet 324 mg (324 mg Oral Given 3/22/21 1946)   ondansetron (ZOFRAN) injection 4 mg (4 mg Intravenous Given 3/22/21 2018)   diphenhydrAMINE (BENADRYL) injection 12.5 mg (12.5 mg Intravenous Given 3/22/21 2144)   metoclopramide (REGLAN) injection 10 mg (10 mg Intravenous Given 3/22/21 2145)   ketorolac (TORADOL) injection 15 mg (15 mg Intravenous Given 3/22/21 2144)     ED Course as of Mar 22 2245   Mon Mar 22, 2021   1916 CTA HEAD NECK W CONTRAST [TV]      ED Course User Index  [TV] Kimberly Trniidad, APRN - CNP    This is a 80-year-old otherwise healthy -American female who presents to the emergency department today via EMS for evaluation of altered mental status. Most of history gathered by patient's significant other. Significant other states that he received a phone call from patient stating that she was at [address] and was unable to drive home due to sudden onset of severe dizziness and generalized weakness. Significant other contacted EMS who then brought patient to the ED.  Patient with history of marijuana use, (MULTIVITAMIN ADULT PO) Comments:   Reason for Stopping:                      (Please note that portions of this note were completed with a voice recognition program.  Efforts were made to edit the dictations but occasionally words are mis-transcribed.)    ANIBAL Fitzgerald CNP (electronically signed)            ANIBAL Fitzgerald CNP  03/22/21 3252

## 2021-03-22 NOTE — ED NOTES
Patient up to [facility] and back to the bed without problems     Tonya Saldana RN  03/22/21 2736

## 2021-03-22 NOTE — ED TRIAGE NOTES
Patient arrived via squad after her boyfriend called them because she was acting confused. The squad states they picked her up in the parking lot of [employer] on [address] around the side of the building. Patient was unabel to give her SSN and seems confused. She denies taking any medications. She states she was going to get food and that is why she drove to the restaurant. Patients car was locked and she has the keys in her purse.

## 2021-03-22 NOTE — ED NOTES
Called  Stroke Team [phone] at [phone], Dr. Thalia Ammon called back at [address]  03/22/21 1722

## 2021-03-22 NOTE — ED NOTES
Patient refusing new peripheral IV or straight sticks for blood cultures. States she does not want to be stuck again.      Pastor Mccormick RN  03/22/21 1944

## 2021-03-22 NOTE — ED PROVIDER NOTES
EKG: Normal sinus rhythm, rate of 65, nonspecific T wave changes. Rhythm strip shows sinus rhythm with a rate of 65, VT interval 140 ms, QRS 78 ms with no other ectopy as interpreted by me. No old EKG available for comparison.      Félix Russo MD  03/22/21 9422
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
## 2021-03-23 ENCOUNTER — APPOINTMENT (OUTPATIENT)
Dept: MRI IMAGING | Age: 51
DRG: 047 | End: 2021-03-23
Payer: COMMERCIAL

## 2021-03-23 PROBLEM — E83.42 HYPOMAGNESEMIA: Status: ACTIVE | Noted: 2021-03-23

## 2021-03-23 PROBLEM — R51.9 HEADACHE: Status: ACTIVE | Noted: 2021-03-23

## 2021-03-23 PROBLEM — F12.10 CANNABIS ABUSE: Status: ACTIVE | Noted: 2021-03-23

## 2021-03-23 PROBLEM — R03.0 ELEVATED BP WITHOUT DIAGNOSIS OF HYPERTENSION: Status: ACTIVE | Noted: 2021-03-23

## 2021-03-23 PROBLEM — R41.82 AMS (ALTERED MENTAL STATUS): Status: ACTIVE | Noted: 2021-03-23

## 2021-03-23 LAB
CHOLESTEROL, TOTAL: 207 MG/DL (ref 0–199)
EKG ATRIAL RATE: 60 BPM
EKG ATRIAL RATE: 65 BPM
EKG DIAGNOSIS: NORMAL
EKG DIAGNOSIS: NORMAL
EKG P AXIS: 16 DEGREES
EKG P AXIS: 20 DEGREES
EKG P-R INTERVAL: 140 MS
EKG P-R INTERVAL: 148 MS
EKG Q-T INTERVAL: 428 MS
EKG Q-T INTERVAL: 430 MS
EKG QRS DURATION: 78 MS
EKG QRS DURATION: 90 MS
EKG QTC CALCULATION (BAZETT): 428 MS
EKG QTC CALCULATION (BAZETT): 447 MS
EKG R AXIS: 32 DEGREES
EKG R AXIS: 40 DEGREES
EKG T AXIS: -61 DEGREES
EKG T AXIS: 8 DEGREES
EKG VENTRICULAR RATE: 60 BPM
EKG VENTRICULAR RATE: 65 BPM
ESTIMATED AVERAGE GLUCOSE: 131.2 MG/DL
HBA1C MFR BLD: 6.2 %
HCT VFR BLD CALC: 40.3 % (ref 36–48)
HDLC SERPL-MCNC: 81 MG/DL (ref 40–60)
HEMOGLOBIN: 13.2 G/DL (ref 12–16)
LDL CHOLESTEROL CALCULATED: 114 MG/DL
LV EF: 58 %
LVEF MODALITY: NORMAL
MCH RBC QN AUTO: 28.9 PG (ref 26–34)
MCHC RBC AUTO-ENTMCNC: 32.9 G/DL (ref 31–36)
MCV RBC AUTO: 88.1 FL (ref 80–100)
PDW BLD-RTO: 17.8 % (ref 12.4–15.4)
PLATELET # BLD: 214 K/UL (ref 135–450)
PMV BLD AUTO: 10.1 FL (ref 5–10.5)
RBC # BLD: 4.57 M/UL (ref 4–5.2)
TRIGL SERPL-MCNC: 62 MG/DL (ref 0–150)
TROPONIN: <0.01 NG/ML
TROPONIN: <0.01 NG/ML
VLDLC SERPL CALC-MCNC: 12 MG/DL
WBC # BLD: 9.6 K/UL (ref 4–11)

## 2021-03-23 PROCEDURE — 36415 COLL VENOUS BLD VENIPUNCTURE: CPT

## 2021-03-23 PROCEDURE — 2580000003 HC RX 258: Performed by: NURSE PRACTITIONER

## 2021-03-23 PROCEDURE — 97165 OT EVAL LOW COMPLEX 30 MIN: CPT

## 2021-03-23 PROCEDURE — A9577 INJ MULTIHANCE: HCPCS | Performed by: NURSE PRACTITIONER

## 2021-03-23 PROCEDURE — 93010 ELECTROCARDIOGRAM REPORT: CPT | Performed by: INTERNAL MEDICINE

## 2021-03-23 PROCEDURE — 2060000000 HC ICU INTERMEDIATE R&B

## 2021-03-23 PROCEDURE — 6370000000 HC RX 637 (ALT 250 FOR IP): Performed by: FAMILY MEDICINE

## 2021-03-23 PROCEDURE — 93306 TTE W/DOPPLER COMPLETE: CPT

## 2021-03-23 PROCEDURE — 85027 COMPLETE CBC AUTOMATED: CPT

## 2021-03-23 PROCEDURE — 94760 N-INVAS EAR/PLS OXIMETRY 1: CPT

## 2021-03-23 PROCEDURE — 80061 LIPID PANEL: CPT

## 2021-03-23 PROCEDURE — 2500000003 HC RX 250 WO HCPCS: Performed by: NURSE PRACTITIONER

## 2021-03-23 PROCEDURE — 6360000004 HC RX CONTRAST MEDICATION: Performed by: NURSE PRACTITIONER

## 2021-03-23 PROCEDURE — 97161 PT EVAL LOW COMPLEX 20 MIN: CPT | Performed by: PHYSICAL THERAPIST

## 2021-03-23 PROCEDURE — 84484 ASSAY OF TROPONIN QUANT: CPT

## 2021-03-23 PROCEDURE — 93005 ELECTROCARDIOGRAM TRACING: CPT | Performed by: NURSE PRACTITIONER

## 2021-03-23 PROCEDURE — 70553 MRI BRAIN STEM W/O & W/DYE: CPT

## 2021-03-23 PROCEDURE — 83036 HEMOGLOBIN GLYCOSYLATED A1C: CPT

## 2021-03-23 PROCEDURE — 6360000002 HC RX W HCPCS: Performed by: NURSE PRACTITIONER

## 2021-03-23 PROCEDURE — 92610 EVALUATE SWALLOWING FUNCTION: CPT

## 2021-03-23 PROCEDURE — 97116 GAIT TRAINING THERAPY: CPT | Performed by: PHYSICAL THERAPIST

## 2021-03-23 PROCEDURE — 6370000000 HC RX 637 (ALT 250 FOR IP): Performed by: NURSE PRACTITIONER

## 2021-03-23 RX ORDER — CLOPIDOGREL BISULFATE 75 MG/1
75 TABLET ORAL DAILY
Status: DISCONTINUED | OUTPATIENT
Start: 2021-03-23 | End: 2021-03-24 | Stop reason: HOSPADM

## 2021-03-23 RX ADMIN — SODIUM CHLORIDE, PRESERVATIVE FREE 10 ML: 5 INJECTION INTRAVENOUS at 08:43

## 2021-03-23 RX ADMIN — ASPIRIN 81 MG: 81 TABLET, COATED ORAL at 08:42

## 2021-03-23 RX ADMIN — LABETALOL HYDROCHLORIDE 10 MG: 5 INJECTION INTRAVENOUS at 21:14

## 2021-03-23 RX ADMIN — ROSUVASTATIN CALCIUM 40 MG: 40 TABLET, FILM COATED ORAL at 21:01

## 2021-03-23 RX ADMIN — SODIUM CHLORIDE, PRESERVATIVE FREE 10 ML: 5 INJECTION INTRAVENOUS at 21:01

## 2021-03-23 RX ADMIN — GADOBENATE DIMEGLUMINE 14 ML: 529 INJECTION, SOLUTION INTRAVENOUS at 10:11

## 2021-03-23 RX ADMIN — CLOPIDOGREL BISULFATE 75 MG: 75 TABLET ORAL at 21:01

## 2021-03-23 ASSESSMENT — PAIN SCALES - GENERAL
PAINLEVEL_OUTOF10: 0

## 2021-03-23 NOTE — PROGRESS NOTES
NAME:  Devora Terrazas  YOB: 1970  MEDICAL RECORD NUMBER:  [id number]  TODAYS DATE:  3/23/2021    Discussed personal risk factors for Stroke /TIA with patient/family, and ways to reduce the risk for a recurrent stroke. Patient's personal risk factors which were identified are:     [] Alcohol Abuse: check with your physician before any alcohol consumption. [] Atrial fibrillation: may cause blood clots. [] Drug Abuse: Seek help, talk with your doctor  [x] Clotting Disorder  [] Diabetes  [] Family history of stroke or heart disease  [x] High Blood Pressure/Hypertension: work with your physician.  [] High cholesterol: monitor cholesterol levels with your physician.   [] Overweight/Obesity: work with your physician for your ideal body weight.  [] Physical Inactivity: get regular exercise as directed by your physician. [] Personal history of previous TIA or stroke  [x] Poor Diet; decrease salt (sodium) in your diet, follow diet directed by physician. [x] Smoking: Cigarette/Cigar: stop smoking. Advised pt. that you can reduce your risk for stroke/TIA by modifying/controlling the risk factors that you have. Pt.advised to take the medications as prescribed, which will be detailed in the discharge instructions, and to not stop taking them without consulting their physician. In addition, pt. advised to maintain a healthy diet, exercise regularly and to not smoke. [facility]'s Stroke treatment and prevention, Managing your recovery  notebook  provided and/or reviewed  with patient/family. The notebook includes, but not limited to, sections addressing warning signs & symptoms of a stroke, which are: sudden numbness or weakness especially on one side of the body, sudden confusion, difficulty speaking or understanding, sudden changes in vision, sudden dizziness or loss of balance/ coordination, or sudden severe headache.   The need to call EMS (911) immediately if signs & symptoms occur is
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
## 2021-03-23 NOTE — CARE COORDINATION
INITIAL CASE MANAGEMENT ASSESSMENT    Reviewed chart, met with patient to assess possible discharge needs. Explained Case Management role/services. Living Situation: Lives alone in a single family home with 4 steps leading up to the house. Home is 1 story. Address and phone number verified. Patient plans to return home. ADLs: Independent and drives self. DME: None    PT/OT Recs: None at present time. Active Services: None     Transportation: [facility] [employer] [phone] will transport patient home at discharge. Medications: Verified [employer] as insurance payor. PCP: NEEDS. Patient agreed for CM to arrange for PCP and follow up. HD/PD: N/A    PLAN/COMMENTS: Arrange for a PCP and follow up visit for patient. CM provided contact information for patient or family to call with any questions. CM will follow and assist as needed.     Laura Jain RN, BSN, Case Management  [phone]  Electronically signed by Laura Jain RN on 3/23/2021 at 10:49 AM

## 2021-03-23 NOTE — PLAN OF CARE
Problem: HEMODYNAMIC STATUS  Goal: Patient has stable vital signs and fluid balance  3/23/2021 1135 by Val Manzano RN  Outcome: Ongoing  Heart rate and rhythm continuously monitored. Vitals taken every four hours. Palpable pulses. Daily weights. Problem: ACTIVITY INTOLERANCE/IMPAIRED MOBILITY  Goal: Mobility/activity is maintained at optimum level for patient  3/23/2021 1135 by Val Manzano RN  Outcome: Ongoing  Up ad ramon     Problem: COMMUNICATION IMPAIRMENT  Goal: Ability to express needs and understand communication  3/23/2021 1135 by Val Manzano RN  Outcome: Ongoing  No impairment      Problem: Nutritional:  Goal: Nutritional status will improve  Description: Nutritional status will improve  3/23/2021 1135 by Val Manzano RN  Outcome: Ongoing  Fair appetite      Problem: Physical Regulation:  Goal: Diagnostic test results will improve  Description: Diagnostic test results will improve  3/23/2021 1135 by Val Manzano RN  Outcome: Ongoing  MRI and echo today      Problem: Respiratory:  Goal: Ability to maintain normal respiratory secretions will improve  Description: Ability to maintain normal respiratory secretions will improve  3/23/2021 1135 by Val Manzano RN  Outcome: Ongoing  No respiratory impairment      Problem: Skin Integrity:  Goal: Demonstration of wound healing without infection will improve  Description: Demonstration of wound healing without infection will improve  3/23/2021 1135 by Val Manzano RN  Outcome: Ongoing  Skin assessment completed every shift per protocol. Pt assessed for incontinence, appropriate barrier cream applied as needed. Pt encouraged to turn/rotate every 2 hours. Assistance provided if pt unable to do so themselves. Will continue to monitor. Problem: Falls - Risk of:  Goal: Will remain free from falls  Description: Will remain free from falls  3/23/2021 1135 by Val Manzano RN  Outcome: Ongoing  Patient is wearing nonskid socks.  Bed is locked, and in lowest position. Room is free of clutter. Call light is within reach and used appropriately. Fall risk assessed per protocol. Will continue to monitor.      Problem: Tobacco Use:  Goal: Inpatient tobacco use cessation counseling participation  Description: Inpatient tobacco use cessation counseling participation  3/23/2021 1135 by Sarika Domínguez RN  Outcome: Ongoing  Patient refused nicotine patch

## 2021-03-23 NOTE — PROGRESS NOTES
4 Eyes Skin Assessment     NAME:  Radha Spaulding  YOB: 1970  MEDICAL RECORD NUMBER:  [id number]    The patient is being assess for  Admission    I agree that 2 RN's have performed a thorough Head to Toe Skin Assessment on the patient. ALL assessment sites listed below have been assessed. Areas assessed by both nurses:    Head, Face, Ears, Shoulders, Back, Chest, Arms, Elbows, Hands, Sacrum. Buttock, Coccyx, Ischium and Legs. Feet and Heels        Does the Patient have a Wound?  No noted wound(s)       Kevon Prevention initiated:  No   Wound Care Orders initiated:  No    Pressure Injury (Stage 3,4, Unstageable, DTI, NWPT, and Complex wounds) if present place consult order under [de-identified] No    New and Established Ostomies if present place consult order under : No      Nurse 1 eSignature: Electronically signed by Sofya Stevenson RN on 3/23/21 at 12:57 AM EDT    **SHARE this note so that the co-signing nurse is able to place an eSignature**    Nurse 2 eSignature: Electronically signed by Roselyn Borges RN on 3/23/21 at 12:58 AM EDT

## 2021-03-23 NOTE — CARE COORDINATION
Spoke with patient. Requesting that this RN select and schedule a PCP with follow up for her for the time of discharge. Follow up scheduled 3/29/92700:30 pm with Dr. Alida Maciel in [facility].

## 2021-03-23 NOTE — PROGRESS NOTES
Speech Language Pathology  Facility/Department: [address] CARE   CLINICAL BEDSIDE SWALLOW EVALUATION    NAME: Orestes Pepe  : 1970  MRN: [id number]    ADMISSION DATE: 3/22/2021  ADMITTING DIAGNOSIS: The primary encounter diagnosis was Altered mental status, unspecified altered mental status type. Diagnoses of Dizziness, Bradycardia, General weakness, and Urinary tract infection without hematuria, site unspecified were also pertinent to this visit. · has Tibia/fibula fracture, right, closed, initial encounter; Closed fracture of right tibial plateau; S/P PILAR-BSO (total abdominal hysterectomy and bilateral salpingo-oophorectomy); Submucous and subserous leiomyoma of uterus; Acute on chronic anemia; Dizziness; Headache; Elevated BP without diagnosis of hypertension; Cannabis abuse; Hypomagnesemia; and AMS (altered mental status) on their problem list.  ·  has a past medical history of Acute on chronic anemia, Cannabis abuse (3/23/2021), Fibroids, and Irregular uterine bleeding. · No known allergies  ONSET DATE: 3/22/2021    Date of Eval: 3/23/2021  Evaluating Therapist: Jed Valladares    Chart Review  · ED MD History and Physical Documentation revealed:   Orestes Pepe is a 48 y.o. female who presents to the emergency department today for evaluation of altered mental status. History provided by patient's significant other. Patient found her significant other while in the parking lot at [address] stating that she had a sudden onset of severe dizziness and generalized weakness. Significant other states that patient sounded \"confused and tired\" on the phone so he contacted EMS. Patient is otherwise healthy without any chronic medical conditions. Spouse states that patient was \"fine\" this morning. There is been no recent travel or known sick contacts. · 3/22/2021 Chest XR:   Impression   1. No active pulmonary disease. · 3/23/2021 MRI Brain:   Impression   1.  There is question of a punctate subacute infarct involving the left   posterior temporal/parietal lobe. 2. No acute intracranial abnormality is seen.  No acute infarct. 3. An area of encephalomalacia/gliosis involving the left frontal lobe near   the vertex, which may represent sequelae of a prior ischemic or traumatic   insult. 4.  Scattered foci of T2 FLAIR hyperintensity are seen within the   supratentorial white matter, which are nonspecific. Diagnostic considerations   include early chronic microvascular ischemic changes, sequelae of chronic   migraines, demyelinating lesions or perhaps vasculitis.       ·  3/23/27792 Neuro consult noted    · Current Diet level:  Current Diet : NPO  Current Liquid Diet : NPO      Primary Complaint  Stroke work up and referrals for SLP evaluation as pt is npo. And f/u regarding SLP/cognitive evaluation unless otherwise notified. Reason for Referral  Mirian Lane was referred for a bedside swallow evaluation to assess the efficiency of her swallow function, identify signs and symptoms of aspiration and make recommendations regarding safe dietary consistencies, effective compensatory strategies, and safe eating environment. Assessment Impression  1. Pt was awake in bed. Pt was oriented x 4. Pt was verbally responsive; provided basic history information and was able to follow one step commands. 2.  Dysphagia Diagnosis: Potential minimal oropharyngeal dysphagia characterized by prolonged, but functional mastication; variable reduced rate of A-P oral transit;unable to r/o potential premature loss during oral phase; and potential delayed swallow. · No clinical s/s of aspiration post swallow;   · no voice quality changes post swallow   · no pt complaints post swallow. Recommend request MD order for regular consistency food and thin liquid diet . SLP F/U regarding diet tolerance x 1-2 . Discussed with RN. 3. Pt reporting speech has comeback from initial ns/s.  Will plan for potential further assessment of cognitive-communication skills. [employer] language skills appeared functional for JESSIKA. Oral motor speech is audible intelligible. MRI findings noted      Dysphagia Outcome Severity Scale: Level 6: Within functional limits/Modified independence     Treatment Plan  Requires SLP Intervention: Yes  Duration/Frequency of Treatment: TBD awaiting MRI Brain regarding SLP Eval of cognitive-linguistic skills. F/U x 1 to 2 for diet tolerance; unless otherwise notified  D/C Recommendations: To be determined       Recommended Diet and Intervention  Diet Solids Recommendation: Regular  Liquid Consistency Recommendation: Thin   Meds as desired; close monitor      Therapeutic Interventions: Diet tolerance monitoring;Patient/Family education; additional pending SLP evaluation unless otherwise notified    Compensatory Swallowing Strategies  Compensatory Swallowing Strategies: Upright for all meals; swallow 2 times per bite/sip;Small bites/sips; Remain upright for 30-45 minutes after meals    Treatment/Goals   Pt goal is for regular diet  Dysphagia Goals: The patient will tolerate recommended diet without observed clinical signs of aspiration; The patient/caregiver will demonstrate understanding of compensatory strategies for improved swallowing safety. General  Chart Reviewed: Yes  Behavior/Cognition: Alert; Cooperative;Pleasant mood(oriented x 4; follows commands)  Respiratory Status: Room air  Communication Observation: Functional  Follows Directions: Simple  Dentition: Adequate  Prior Dysphagia History: Pt denies history    Patient Positioning: Upright in bed    Consistencies Administered: Reg solid; Dysphagia Soft and Bite-Sized (Dysphagia III); Dysphagia Minced and Moist (Dysphagia II); Dysphagia Pureed (Dysphagia I); Nectar - cup; Thin - cup; Thin - straw    Pain: denied    Vision/Hearing  Vision  Vision: Within Functional Limits  Hearing  Hearing: Within functional limits    Oral Motor Deficits  Oral/Motor  Oral Motor: Exceptions to [facility]  Labial Symmetry: (very slight)  Labial Strength: Reduced(left <right)  Lingual ROM: (good rom;midline protrusion/elevation)  Velum: (fairly symmetrical)  Gag: (diminished to absent)   Vocal Quality: (unremarkable)  Volitional Cough: Strong  Volitional Swallow: Delayed    Oral Phase Dysfunction  Oral Phase  Oral Phase: Exceptions  Oral Phase Dysfunction  Decreased Anterior to Posterior Transit: (variable prolonged rate of regular)  Suspected Premature Bolus Loss: (unable to r/o)  Lingual/Palatal Residue: (pt has sensation and clears spontaneously)     Indicators of Pharyngeal Phase Dysfunction   Pharyngeal Phase  Pharyngeal Phase: Exceptions  Indicators of Pharyngeal Phase Dysfunction  Delayed Swallow: All  Pharyngeal Phase   · Pharyngeal: No overt clinical s/s of aspiration post swallow; no voice quality changes post swallow; no pt complaints post swallow    Prognosis  Prognosis  Prognosis for safe diet advancement: good  Individuals consulted  Consulted and agree with results and recommendations: Patient;RN    Education  Patient Education Response: Verbalizes understanding  Safety Devices in place: Yes  Type of devices: All fall risk precautions in place; Bed alarm in place;Call light within reach       Therapy Time  SLP Individual Minutes  Time In: 7399  Time Out: 0028  Minutes: 35   coded treatment time: 0       Signed  Kathy TaylorMS,CCC,SLP [id number]  Speech and Language Pathologist  3/23/2021 1:10 PM
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
## 2021-03-23 NOTE — PLAN OF CARE
Problem: HEMODYNAMIC STATUS  Goal: Patient has stable vital signs and fluid balance  Outcome: Ongoing     Problem: ACTIVITY INTOLERANCE/IMPAIRED MOBILITY  Goal: Mobility/activity is maintained at optimum level for patient  Outcome: Ongoing     Problem: COMMUNICATION IMPAIRMENT  Goal: Ability to express needs and understand communication  Outcome: Ongoing     Problem: Nutritional:  Goal: Nutritional status will improve  Description: Nutritional status will improve  Outcome: Ongoing     Problem: Physical Regulation:  Goal: Diagnostic test results will improve  Description: Diagnostic test results will improve  Outcome: Ongoing  Goal: Will remain free from infection  Description: Will remain free from infection  Outcome: Ongoing  Goal: Ability to maintain vital signs within normal range will improve  Description: Ability to maintain vital signs within normal range will improve  Outcome: Ongoing     Problem: Respiratory:  Goal: Ability to maintain normal respiratory secretions will improve  Description: Ability to maintain normal respiratory secretions will improve  Outcome: Ongoing     Problem: Skin Integrity:  Goal: Demonstration of wound healing without infection will improve  Description: Demonstration of wound healing without infection will improve  Outcome: Ongoing  Goal: Complications related to intravenous access or infusion will be avoided or minimized  Description: Complications related to intravenous access or infusion will be avoided or minimized  Outcome: Ongoing     Problem: Falls - Risk of:  Goal: Will remain free from falls  Description: Will remain free from falls  Outcome: Ongoing  Goal: Absence of physical injury  Description: Absence of physical injury  Outcome: Ongoing     Problem: Tobacco Use:  Goal: Inpatient tobacco use cessation counseling participation  Description: Inpatient tobacco use cessation counseling participation  Outcome: Ongoing

## 2021-03-23 NOTE — PROGRESS NOTES
Occupational Therapy  Attempt Note    Pati Villafana  3/23/2021    OT orders received. OT attempted to see for OT eval/tx, but was unable to see secondary to pt being out of room at echo . Will attempt again later as time permits.     Alonzo Rice, OTR/L [id number]

## 2021-03-23 NOTE — PROGRESS NOTES
Physical Therapy    Facility/Department: [address] PROGRESSIVE CARE  Initial Assessment    NAME: Gayathri Rogers  : 1970  MRN: [id number]    Date of Service: 3/23/2021    Discharge Recommendations:  Home with assist PRN   PT Equipment Recommendations  Equipment Needed: No  Gayathri Rogers scored a 22/24 on the AM-PAC short mobility form. At this time, no further PT is recommended upon discharge. Recommend patient returns to prior setting with prior services. Assessment   Assessment: per ER note:  Gayathri Rogers is a 48 y.o. female who presents to the emergency department today for evaluation of altered mental status. History provided by patient's significant other. Patient found her significant other while in the parking lot at [address] [employer] stating that she had a sudden onset of severe dizziness and generalized weakness. Significant other states that patient sounded \"confused and tired\" on the phone so he contacted EMS. Patient is otherwise healthy without any chronic medical conditions. Spouse states that patient was \"fine\" this morning. There is been no recent travel or known sick contacts. MRI results indicate There is question of a punctate subacute infarct involving the left posterior temporal/parietal lobe. No acute infarct. Pt appears close to her baseline for functional tasks; Will see for one more visit to practice stairs and longer distance gait if pt remains in hosp; no further therapy needed at discharge  Prognosis: Good  PT Education: PT Role;General Safety  Barriers to Learning: none  REQUIRES PT FOLLOW UP: Yes  Activity Tolerance  Activity Tolerance: Patient Tolerated treatment well       Patient Diagnosis(es): The primary encounter diagnosis was Altered mental status, unspecified altered mental status type. Diagnoses of Dizziness, Bradycardia, General weakness, and Urinary tract infection without hematuria, site unspecified were also pertinent to this visit.      has a past medical history of Acute on chronic anemia, Cannabis abuse, Fibroids, and Irregular uterine bleeding. has a past surgical history that includes [place] tooth extraction (Bilateral); orif femur decompression (Right); and alejandra and bso (cervix removed) (Bilateral, 10/22/2020). Restrictions  Restrictions/Precautions  Restrictions/Precautions: Up Ad Ninfa  Vision/Hearing  Vision: Within Functional Limits  Hearing: Within functional limits     Subjective  General  Chart Reviewed: Yes  Additional Pertinent Hx: per ER note:  Harsh Murray is a 48 y.o. female who presents to the emergency department today for evaluation of altered mental status. History provided by patient's significant other. Patient found her significant other while in the parking lot at [address] stating that she had a sudden onset of severe dizziness and generalized weakness. Significant other states that patient sounded \"confused and tired\" on the phone so he contacted EMS. Patient is otherwise healthy without any chronic medical conditions. Spouse states that patient was \"fine\" this morning. There is been no recent travel or known sick contacts. MRI results indicate There is question of a punctate subacute infarct involving the left posterior temporal/parietal lobe. No acute infarct.   Response To Previous Treatment: Not applicable  Family / Caregiver Present: No  Follows Commands: Within Functional Limits  Subjective  Subjective: pt very soft spoken; agreeable to working with therapy; reports having a headache  Pain Screening  Patient Currently in Pain: Denies          Orientation  Orientation  Overall Orientation Status: Within Functional Limits  Social/Functional History  Social/Functional History  Lives With: Alone(boyfriend over sometimes)  Type of Home: [facility] [address]: One level(laundry on main level)  Home Access: Stairs to enter with rails  Entrance Stairs - Number of Steps: 4  Entrance Stairs - Rails: Both  Bathroom Shower/Tub: Tub/Shower Minutes 26                 KRISSY RILEY, [facility], [address] [facility], [address]

## 2021-03-23 NOTE — PROGRESS NOTES
Patient is admitted to room 0475 18 [id number] from the emergency room. Patient has arrived to the floor at this time via stretcher and ambulated/transfered to bed. Oriented to room. Call light and bedside table within reach. Denies further needs at this time. Will continue to monitor.  Leana Calvillo     Electronically signed by Leana Calvillo RN on 3/22/2021 at 10:42 PM

## 2021-03-24 VITALS
HEART RATE: 72 BPM | TEMPERATURE: 98.5 F | WEIGHT: 148.81 LBS | SYSTOLIC BLOOD PRESSURE: 164 MMHG | RESPIRATION RATE: 16 BRPM | HEIGHT: 68 IN | BODY MASS INDEX: 22.55 KG/M2 | DIASTOLIC BLOOD PRESSURE: 94 MMHG | OXYGEN SATURATION: 96 %

## 2021-03-24 PROCEDURE — 6370000000 HC RX 637 (ALT 250 FOR IP): Performed by: NURSE PRACTITIONER

## 2021-03-24 PROCEDURE — 92523 SPEECH SOUND LANG COMPREHEN: CPT

## 2021-03-24 PROCEDURE — 94760 N-INVAS EAR/PLS OXIMETRY 1: CPT

## 2021-03-24 PROCEDURE — 2580000003 HC RX 258: Performed by: NURSE PRACTITIONER

## 2021-03-24 PROCEDURE — 97116 GAIT TRAINING THERAPY: CPT | Performed by: PHYSICAL THERAPIST

## 2021-03-24 PROCEDURE — 6370000000 HC RX 637 (ALT 250 FOR IP): Performed by: FAMILY MEDICINE

## 2021-03-24 RX ORDER — ASPIRIN 81 MG/1
81 TABLET ORAL DAILY
Qty: 30 TABLET | Refills: 3 | Status: SHIPPED | OUTPATIENT
Start: 2021-03-25

## 2021-03-24 RX ORDER — ROSUVASTATIN CALCIUM 40 MG/1
40 TABLET, COATED ORAL NIGHTLY
Qty: 30 TABLET | Refills: 3 | Status: SHIPPED | OUTPATIENT
Start: 2021-03-24

## 2021-03-24 RX ORDER — CLOPIDOGREL BISULFATE 75 MG/1
75 TABLET ORAL DAILY
Qty: 21 TABLET | Refills: 0 | Status: SHIPPED | OUTPATIENT
Start: 2021-03-25 | End: 2021-04-15

## 2021-03-24 RX ORDER — HYDROCHLOROTHIAZIDE 25 MG/1
25 TABLET ORAL EVERY MORNING
Qty: 90 TABLET | Refills: 1 | Status: SHIPPED | OUTPATIENT
Start: 2021-03-24

## 2021-03-24 RX ADMIN — CLOPIDOGREL BISULFATE 75 MG: 75 TABLET ORAL at 09:10

## 2021-03-24 RX ADMIN — SODIUM CHLORIDE, PRESERVATIVE FREE 10 ML: 5 INJECTION INTRAVENOUS at 09:11

## 2021-03-24 RX ADMIN — ASPIRIN 81 MG: 81 TABLET, COATED ORAL at 09:10

## 2021-03-24 NOTE — PROGRESS NOTES
is been no recent travel or known sick contacts.     · 3/22/2021 Chest XR:   Impression   1. No active pulmonary disease.      · 3/23/2021 MRI Brain:   Impression   1. There is question of a punctate subacute infarct involving the left   posterior temporal/parietal lobe. 2. No acute intracranial abnormality is seen.  No acute infarct. 3. An area of encephalomalacia/gliosis involving the left frontal lobe near   the vertex, which may represent sequelae of a prior ischemic or traumatic   insult. 4.  Scattered foci of T2 FLAIR hyperintensity are seen within the   supratentorial white matter, which are nonspecific. Diagnostic considerations   include early chronic microvascular ischemic changes, sequelae of chronic   migraines, demyelinating lesions or perhaps vasculitis.           Primary Complaint:   P complained of being dizzy; problems communicating at onset. Pt reported doing better. Assessment Impressions:  1. Cognitive Diagnosis: Pt was oriented x 4 and demonstrated functional concrete attention; VPS/PS ; math language skills; and STM skills on testing. Pt with breakdown with complex processing; mental flexibility; higher level attention and verbal reasoning. Unclear if baseline. 2. Aphasia Diagnosis: Testing revealed functional CFN; responsive naming and convey basic needs and wants and concrete event/concept descriptions. Breakdown with mild complex concept/event descriptions with increase in word retrieval and thought organization deficits. Rate of processing is slow and most optimal for concrete information and 1-2 step commands. Unclear if at baseline  3. Speech Diagnosis: Oral Motor speech is audible and intelligible for connected speech. 4.  Communication Diagnosis: [place] language skills intact. Breakdown with complex processing.      Recommendations:  Pt being discharged today and declined any further therapy at this time    Subjective:   Previous level of function and limitations: History per chart review and pt self report  General  Chart Reviewed: Yes  Family / Caregiver Present: No  General Comment  Comments: Pt reporting anxious to get home. Pt reporting speech is better  Social/Functional History  Type of Home: House  ADL Assistance: Independent  Homemaking Assistance: Independent  Homemaking Responsibilities: Yes  Ambulation Assistance: Independent  Transfer Assistance: Independent  Occupation: Unemployed        Objective: Assessment included Oral Motor Speech mechanism Exam; Portions of BDAE and Cognitive-Linguistic Exam  Pain: denied    Vision  Vision: Impaired  Vision Exceptions: Wears glasses for reading  Hearing  Hearing: Within functional limits     Auditory Comprehension  Comprehension: Exceptions  Basic Questions: (intact on testing)  Complex Questions: Mild  One Step Basic Commands: (intact on testing)  Two Step Basic Commands: (intact on testing)  Complex/Abstract Commands: Mild  L/R Discrimination: (intact on testing)  Conversation: (slow rate of processing and variable clarification cues or complex processing)    Reading Comprehension  Reading Status: Within functional limits(for concrete 1-3 line paragraph level. Ongoing assessment unless otherwise notified)    Expression  Primary Mode of Expression: Verbal    Verbal Expression  Verbal Expression: Exceptions to functional limits  Initiation: (intact on testing)  Repetition: (intact on testing)  Automatic Speech: (intact on testing)  Confrontation: (34/36 on BDAE CFN)  Convergent: (limited to concrete)  Divergent: (limited to concrete)  Responsive: (intact on testing)  Conversation: Mild(Pt conveying likes/dislikes; wants and needs. Pt with IND concrete concept/event description. Pt with increase in word searching / thought organization problems for mild complex concept/event descriptions)    Written Expression  Written Expression: (DNT)    Motor Speech  Motor Speech:  Within Functional Limits(audible intelligible connected speech)  Oral Motor: Exceptions to [facility]  Labial Symmetry: (very slight asym and weakness)  Lingual Strength: Reduced    Pragmatics/Social Functioning  Pragmatics: Within functional limits(intact on testing)    Cognition:   Orientation   Overall Orientation Status: Within Functional Limits(intact on testing)  Attention  Attention: Exceptions to [facility]  Alternating Attention: Mild  Selective Attention: (intact on testing)  Sustained Attention: (intact on testing)  Memory  Memory: Exceptions to [facility]  Short-term Memory: (grossly intact on testing)  Working Memory: Mild  Problem Solving  Problem Solving: Exceptions to [facility]  Complex Functional Tasks: To be assessed in therapy  Simple Functional Tasks: (intact on testing)  Verbal Reasoning Skills: (mild to moderate; limited to concrete)  Numeric Reasoning  Numeric Reasoning: Exceptions to [facility]   Calculations: (intact on testing)  Money Management: To be assessed in therapy  Time: To be assessed in therapy  Abstract Reasoning  Abstract Reasoning: Exceptions to [facility]  Convergent Thinking: (mild to moderate deficits; limited to concrete)  Divergent Thinking: To be assessed in therapy  Safety/Judgement  Safety/Judgement: Exceptions to [facility]  Complex Functional Tasks: To be assessed in therapy  Routine Tasks: (intact on testing; ongoing assessment)  Impulsive: (noted)  Flexibility of Thought: (impaired; but unclear baseline)      Prognosis:  Speech Therapy Prognosis  Prognosis: Good(guarded medical co-morbidities)  Individuals consulted  Consulted and agree with results and recommendations: Patient;RN    Education:  Safety Devices in place: Yes  Type of devices: All fall risk precautions in place    Therapy Time:   Individual   Time In 1225   Time Out 1300   Minutes 28         Signed  Kathy Taylor,MS,CCC,SLP [id number]  Speech and Language Pathologist  3/24/2021 4:05 PM

## 2021-03-24 NOTE — PROGRESS NOTES
NAME:  Alysia Robles  YOB: 1970  MEDICAL RECORD NUMBER:  [id number]  TODAYS DATE:  3/23/2021    Discussed personal risk factors for Stroke /TIA with patient/family, and ways to reduce the risk for a recurrent stroke. Patient's personal risk factors which were identified are:     [] Alcohol Abuse: check with your physician before any alcohol consumption. [] Atrial fibrillation: may cause blood clots. [x] Drug Abuse: Seek help, talk with your doctor  [] Clotting Disorder  [] Diabetes  [] Family history of stroke or heart disease  [x] High Blood Pressure/Hypertension: work with your physician.  [] High cholesterol: monitor cholesterol levels with your physician.   [] Overweight/Obesity: work with your physician for your ideal body weight. [x] Physical Inactivity: get regular exercise as directed by your physician. [x] Personal history of previous TIA or stroke  [x] Poor Diet; decrease salt (sodium) in your diet, follow diet directed by physician. [x] Smoking: Cigarette/Cigar: stop smoking. Advised pt. that you can reduce your risk for stroke/TIA by modifying/controlling the risk factors that you have. Pt.advised to take the medications as prescribed, which will be detailed in the discharge instructions, and to not stop taking them without consulting their physician. In addition, pt. advised to maintain a healthy diet, exercise regularly and to not smoke. [facility]'s Stroke treatment and prevention, Managing your recovery  notebook  provided and/or reviewed  with patient/family. The notebook includes, but not limited to, sections addressing warning signs & symptoms of a stroke, which are: sudden numbness or weakness especially on one side of the body, sudden confusion, difficulty speaking or understanding, sudden changes in vision, sudden dizziness or loss of balance/ coordination, or sudden severe headache.   The need to call EMS (911) immediately if signs & symptoms occur is
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
## 2021-03-24 NOTE — PROGRESS NOTES
Physical Therapy  Facility/Department: [facility]  Daily Treatment Note/Discharge Note  NAME: Orestes Pepe  : 1970  MRN: [id number]    Date of Service: 3/24/2021    Discharge Recommendations:  Home independently   PT Equipment Recommendations  Equipment Needed: No  Orestes Pepe scored a 23/24 on the AM-PAC short mobility form. At this time, no further PT is recommended upon discharge. Recommend patient returns to prior setting with prior services. Assessment   Body structures, Functions, Activity limitations: Decreased functional mobility   Assessment: Pt appears back to her normal for functional tasks; she is Ind with bed mob, transfers and amb without an AD. She negotiated stairs without difficulty SBA; do not anticipate any deficits upon returning home; currently appears to be a low fall risk from therapy standpoint. Recommend home Ind without any further therapy needs  Prognosis: Good;Excellent  PT Education: General Safety  Barriers to Learning: none  No Skilled PT: Independent with functional mobility   REQUIRES PT FOLLOW UP: No  Activity Tolerance  Activity Tolerance: Patient Tolerated treatment well     Patient Diagnosis(es): The primary encounter diagnosis was Altered mental status, unspecified altered mental status type. Diagnoses of Dizziness, Bradycardia, General weakness, and Urinary tract infection without hematuria, site unspecified were also pertinent to this visit. has a past medical history of Acute on chronic anemia, Cannabis abuse, Fibroids, and Irregular uterine bleeding. has a past surgical history that includes [place] tooth extraction (Bilateral); orif femur decompression (Right); and alejandra and bso (cervix removed) (Bilateral, 10/22/2020). Restrictions  Restrictions/Precautions  Restrictions/Precautions: Up Ad Ninfa  Subjective   General  Chart Reviewed:  Yes  Additional Pertinent Hx: per ER note:  Orestes Pepe is a 48 y.o. female who presents to the emergency department today for evaluation of altered mental status. History provided by patient's significant other. Patient found her significant other while in the parking lot at [address] stating that she had a sudden onset of severe dizziness and generalized weakness. Significant other states that patient sounded \"confused and tired\" on the phone so he contacted EMS. Patient is otherwise healthy without any chronic medical conditions. Spouse states that patient was \"fine\" this morning. There is been no recent travel or known sick contacts. MRI results indicate There is question of a punctate subacute infarct involving the left posterior temporal/parietal lobe. No acute infarct. Response To Previous Treatment: Patient with no complaints from previous session. Family / Caregiver Present: No  Subjective  Subjective: pt initially irritated but after allowing her to voice her c/o the comfort of the bed, she was more pleasant during session          Orientation  Orientation  Overall Orientation Status: Within Functional Limits  Cognition   Cognition  Overall Cognitive Status: WFL  Objective   Bed mobility  Supine to Sit: Independent  Sit to Supine: Independent  Transfers  Sit to Stand: Independent  Stand to sit:  Independent  Ambulation  Ambulation?: Yes  Ambulation 1  Surface: level tile  Device: No Device  Assistance: Independent  Quality of Gait: no LOB; good pace, no deviations noted  Distance: 150'  Stairs/Curb  Stairs?: Yes  Stairs  # Steps : 12  Stairs Height: 8\"  Rails: (intermittently used rail but did not appear to need it for balance)  Assistance: Supervision;Stand by assistance     Balance  Sitting - Static: Good  Sitting - Dynamic: Good  Standing - Static: Good  Standing - Dynamic: Good            AM-PAC Score  AM-PAC Inpatient Mobility Raw Score : 23 (03/24/21 0931)  AM-PAC Inpatient T-Scale Score : 56.93 (03/24/21 0931)  Mobility Inpatient CMS 0-100% Score: 11.2 (03/24/21 0931)  Mobility Inpatient CMS G-Code Modifier : CI (03/24/21 0931)          Goals  Short term goals  Time Frame for Short term goals: by discharge - all goals met 3-24  Short term goal 1: amb 100-200' without AD sup/MI  Short term goal 2: negotiate stairs SBA  Patient Goals   Patient goals : to go home    Plan    Plan  Times per week: 1 more visit  Current Treatment Recommendations: Endurance Training, Stair training  Plan Comment: no further therapy indicated  Safety Devices  Type of devices: Call light within reach, Left in bed, Nurse notified     Therapy Time   Individual Concurrent Group Co-treatment   Time In 0908         Time Out 0932         Minutes 24                 KRISSY RILEY, [address] [place] [address], [facility]
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
## 2022-02-02 ENCOUNTER — TELEPHONE (OUTPATIENT)
Dept: FAMILY MEDICINE CLINIC | Age: 52
End: 2022-02-02

## 2022-02-02 NOTE — TELEPHONE ENCOUNTER
Pt came in to office to inquire about outstanding bad debt that occurred from surgery on 09-   Balance was turned over to collection. Medicaid told her that we did not send proper documentation to get claim paid     [phone]    Number for medicaid.      Pt phone #   [phone]

## 2022-02-03 NOTE — TELEPHONE ENCOUNTER
Have already reached out to billing to try and determine what is going on. If she is Medicaid, she should not be getting anything about a bill. I think this goes back to 2020 and has to do with the consents when Jamari Deion was completing them.

## 2022-05-16 NOTE — ADDENDUM NOTE
Addended by: Angelic Burks on: 12/21/2017 11:35 AM     Modules accepted: Orders Anesthesia Pre Eval Note    Anesthesia ROS/Med Hx        Additional Results:     ALLERGIES:   -- Caffeine -- Other (See Comments)    --  Unknown   -- Dilantin -- RASH    --  Unknown   -- Latex -- Other (See Comments)   -- Naproxen -- PRURITUS    --  Unknown       Last Labs        Component                Value               Date/Time                  WBC                      4.0 (L)             03/11/2022 1128            RBC                      4.92                03/11/2022 1128            HGB                      12.9                03/11/2022 1128            HCT                      41.9                03/11/2022 1128            MCV                      85.2                03/11/2022 1128            MCH                      26.2                03/11/2022 1128            MCHC                     30.8 (L)            03/11/2022 1128            RDW-CV                   16.0 (H)            03/11/2022 1128            Sodium                   142                 03/11/2022 1128            Potassium                4.4                 03/11/2022 1128            Chloride                 106                 03/11/2022 1128            Carbon Dioxide           31                  03/11/2022 1128            Glucose                  87                  03/11/2022 1128            BUN                      16                  03/11/2022 1128            Creatinine               0.89                03/11/2022 1128            Glomerular Filtrati*     76                  03/11/2022 1128            Calcium                  9.3                 03/11/2022 1128            PLT                      283                 03/11/2022 1128        Past Medical History:  No date: Aneurysm (CMS/HCC)  No date: Arthritis  No date: Cataract  No date: Depressive disorder  No date: Essential (primary) hypertension  No date: Failed moderate sedation during procedure  No date: Glaucoma  No date: Glaucoma (increased eye pressure)  No date: HLD  (hyperlipidemia)  11/1979: SAH (subarachnoid hemorrhage) (CMS/Formerly [facility])  No date: Sleep apnea    Past Surgical History:  No date: Anes cataract surgery,complex; Bilateral      Comment:  right eye 6/29/2020;  left eye 7/15/2020  11/1979: Brain aneurysm surgery      Comment:  right sided, unknown vessel.    No date: Brain surgery  No date: Gallbladder surgery  No date: Hysterectomy  No date: Removal gallbladder  No date: Shoulder surgery  No date: Tubal ligation       Prior to Admission medications :  Medication losartan (COZAAR) 50 MG tablet, Sig TAKE 1 TABLET BY MOUTH DAILY. STOP LISINOPRIL, Start Date 4/7/22, End Date 4/7/23, Taking? Yes, Authorizing Provider Abel Hauser MD    Medication aspirin (ECOTRIN) 81 MG EC tablet, Sig Take 81 mg by mouth daily., Start Date , End Date , Taking? Yes, Authorizing Provider Outside Provider    Medication rosuvastatin (CRESTOR) 10 MG tablet, Sig Take 1 tablet by mouth at bedtime., Start Date 3/11/22, End Date , Taking? Yes, Authorizing Provider Анна Morrell MD    Medication brimonidine (ALPHAGAN) 0.2 % ophthalmic solution, Sig , Start Date 8/20/20, End Date , Taking? Yes, Authorizing Provider Outside Provider    Medication timolol (TIMOPTIC) 0.5 % ophthalmic solution, Sig INT 1 GTT IN OU BID, Start Date 8/20/20, End Date , Taking? Yes, Authorizing Provider Outside Provider    Medication latanoprost (XALATAN) 0.005 % ophthalmic solution, Sig , Start Date 2/12/18, End Date , Taking? Yes, Authorizing Provider Outside Provider    Medication acetaminophen (TYLENOL 8 HOUR ARTHRITIS PAIN) 650 MG CR tablet, Sig TAKE 2 TABLET EVERY 8 HOURS PRN, Start Date , End Date , Taking? Yes, Authorizing Provider Outside Provider    Medication ergocalciferol (DRISDOL) 1.25 mg (50,000 units) capsule, Sig Take 1 capsule a week until gone then take over-the-counter vitamin D3 1000 units once a day, Start Date 3/21/22, End Date , Taking? , Authorizing Provider Анна Morrell MD    Medication  amitriptyline (ELAVIL) 10 MG tablet, Sig Take 1 tablet by mouth nightly., Start Date 12/8/21, End Date , Taking? , Authorizing Provider Pepito Mccnan MD    Medication levETIRAcetam (KepPRA) 500 MG tablet, Sig TAKE 0.5 TABLET 2x daily, Start Date 7/9/21, End Date , Taking? , Authorizing Provider Abel Hauser MD    Medication zoster vaccine recomb adjuvanted (SHINGRIX) 50 MCG/0.5ML injection, Sig Inject 0.5 mLs into the muscle 1 time. Repeat dose in 2 to 6 months (unless 1 dose already given), for a total of 2 doses., Start Date 1/15/21, End Date , Taking? , Authorizing Provider Abel Hauser MD         Patient Vitals in the past 24 hrs:  05/16/22 1212, BP:116/64, Temp:37 °C (98.6 °F), Temp src:Oral, Pulse:62, Resp:16, SpO2:98 %      Relevant Problems   No relevant active problems       Physical Exam     Airway   Mallampati: II    Cardiovascular    Cardiovascular Note: No cardiac distress    General Assessment  General Assessment: Alert and oriented and No acute distress    Pulmonary Exam    Pulmonary Note: Breathing without distress        Anesthesia Plan:    ASA Status: 3  Anesthesia Type: MAC    Induction: Intravenous  Preferred Airway Type: Nasal Cannula  Maintenance: TIVA  Premedication: None      Postoperative analgesia plan does NOT include opiods    Checklist  Reviewed: Past Med History  Consent/Risks Discussed Statement:  The proposed anesthetic plan, including its risks and benefits, have been discussed with the Patient and Other Family Member along with the risks and benefits of alternatives. Questions were encouraged and answered and the patient and/or representative understands and agrees to proceed.        I discussed with the patient (and/or patient's legal representative) the risks and benefits of the proposed anesthesia plan, MAC, which may include services performed by other anesthesia providers.    Alternative anesthesia plans, if available, were reviewed with the patient (and/or patient's  legal representative). Discussion has been held with the patient (and/or patient's legal representative) regarding risks of anesthesia, which include Nausea, Vomiting, Allergic Reaction and Intra-operative Awareness and emergent situations that may require change in anesthesia plan.    The patient (and/or patient's legal representative) has indicated understanding, his/her questions have been answered, and he/she wishes to proceed with the planned anesthetic.

## 2022-06-17 ENCOUNTER — HOSPITAL ENCOUNTER (EMERGENCY)
Age: 52
Discharge: HOME | End: 2022-06-17
Attending: EMERGENCY MEDICINE
Payer: MEDICAID

## 2022-06-17 VITALS
WEIGHT: 157.41 LBS | BODY MASS INDEX: 23.86 KG/M2 | HEIGHT: 68 IN | DIASTOLIC BLOOD PRESSURE: 80 MMHG | TEMPERATURE: 99.4 F | SYSTOLIC BLOOD PRESSURE: 160 MMHG | OXYGEN SATURATION: 99 % | HEART RATE: 90 BPM | RESPIRATION RATE: 18 BRPM

## 2022-06-17 DIAGNOSIS — R21 RASH AND OTHER NONSPECIFIC SKIN ERUPTION: Primary | ICD-10-CM

## 2022-06-17 PROCEDURE — 96372 THER/PROPH/DIAG INJ SC/IM: CPT

## 2022-06-17 PROCEDURE — 6360000002 HC RX W HCPCS: Performed by: EMERGENCY MEDICINE

## 2022-06-17 PROCEDURE — 99284 EMERGENCY DEPT VISIT MOD MDM: CPT

## 2022-06-17 PROCEDURE — 6370000000 HC RX 637 (ALT 250 FOR IP): Performed by: EMERGENCY MEDICINE

## 2022-06-17 RX ORDER — CETIRIZINE HYDROCHLORIDE 10 MG/1
10 TABLET ORAL DAILY
Qty: 30 TABLET | Refills: 0 | Status: SHIPPED | OUTPATIENT
Start: 2022-06-17

## 2022-06-17 RX ORDER — CLINDAMYCIN HYDROCHLORIDE 150 MG/1
150 CAPSULE ORAL ONCE
Status: COMPLETED | OUTPATIENT
Start: 2022-06-17 | End: 2022-06-17

## 2022-06-17 RX ORDER — KETOROLAC TROMETHAMINE 30 MG/ML
15 INJECTION, SOLUTION INTRAMUSCULAR; INTRAVENOUS ONCE
Status: COMPLETED | OUTPATIENT
Start: 2022-06-17 | End: 2022-06-17

## 2022-06-17 RX ORDER — DIPHENHYDRAMINE HCL 25 MG
25 TABLET ORAL ONCE
Status: COMPLETED | OUTPATIENT
Start: 2022-06-17 | End: 2022-06-17

## 2022-06-17 RX ORDER — NAPROXEN 500 MG/1
500 TABLET ORAL 2 TIMES DAILY WITH MEALS
Qty: 60 TABLET | Refills: 5 | Status: SHIPPED | OUTPATIENT
Start: 2022-06-17

## 2022-06-17 RX ORDER — CLINDAMYCIN HYDROCHLORIDE 300 MG/1
300 CAPSULE ORAL 4 TIMES DAILY
Qty: 28 CAPSULE | Refills: 0 | Status: SHIPPED | OUTPATIENT
Start: 2022-06-17 | End: 2022-06-24

## 2022-06-17 RX ADMIN — CLINDAMYCIN HYDROCHLORIDE 150 MG: 150 CAPSULE ORAL at 23:07

## 2022-06-17 RX ADMIN — DIPHENHYDRAMINE HCL 25 MG: 25 TABLET ORAL at 23:07

## 2022-06-17 RX ADMIN — KETOROLAC TROMETHAMINE 15 MG: 30 INJECTION, SOLUTION INTRAMUSCULAR at 23:08

## 2022-06-18 ASSESSMENT — ENCOUNTER SYMPTOMS: COLOR CHANGE: 0

## 2022-06-18 NOTE — ED PROVIDER NOTES
[address] [facility]      Pt Name: Dori Stone  MRN: [id number]  [place] 1970  Date ofevaluation: 6/17/2022  Provider: Clayton Watson MD    CHIEF COMPLAINT       Chief Complaint   Patient presents with    Rash     red bumps on both legs started 3 days ago  legs are swollen pt is having issues walking          HISTORY OF PRESENT ILLNESS   (Location/Symptom, Timing/Onset,Context/Setting, Quality, Duration, Modifying Factors, Severity)  Note limiting factors. Dori Stone is a 46 y.o. female  who  has a past medical history of Acute on chronic anemia, Cannabis abuse, Fibroids, and Irregular uterine bleeding. who presents to the emergency department for evaluation of rash and bilateral lower extremity pain. Patient reports 3-day history of bilateral lower extremity rash consisting of erythematous raised lesions. States also has soreness in the muscles bilaterally. Denies fevers numbness or weakness. Denies new exposures. States she has not changed hygiene products or cleaning products. Denies other people at home with similar symptoms. States she has not been outside and denies any recent travel. Patient does not take medications for her symptoms. HPI    NursingNotes were reviewed. REVIEW OF SYSTEMS    (2-9 systems for level 4, 10 or more for level 5)     Review of Systems   Musculoskeletal: Positive for myalgias. Skin: Positive for rash. Negative for color change. Except as noted above the remainder of the review of systems was reviewed and negative. PAST MEDICAL HISTORY     Past Medical History:   Diagnosis Date    Acute on chronic anemia     Cannabis abuse 3/23/2021    Fibroids     Irregular uterine bleeding          SURGICALHISTORY       Past Surgical History:   Procedure Laterality Date    ORIF FEMUR DECOMPRESSION Right     ORIF Rt. tibial plateau fx.      PILAR AND BSO (CERVIX REMOVED) Bilateral 10/22/2020 TOTAL ABDOMINAL HYSTERECTOMY BILATERAL SALPINGO-OOPHORECTOMY VERTICAL SKIN INCISION performed by Janelle Hauser MD at [address] EXTRACTION Bilateral     all of [place]         CURRENT MEDICATIONS       Discharge Medication List as of 6/17/2022 11:25 PM      CONTINUE these medications which have NOT CHANGED    Details   aspirin 81 MG EC tablet Take 1 tablet by mouth daily, Disp-30 tablet, R-3Normal      rosuvastatin (CRESTOR) 40 MG tablet Take 1 tablet by mouth nightly, Disp-30 tablet, R-3Normal      clopidogrel (PLAVIX) 75 MG tablet Take 1 tablet by mouth daily for 21 days, Disp-21 tablet, R-0Normal      hydroCHLOROthiazide (HYDRODIURIL) 25 MG tablet Take 1 tablet by mouth every morning, Disp-90 tablet, R-1Normal                  Patient has no known allergies.     FAMILY HISTORY       Family History   Problem Relation Age of Onset    Rheum Arthritis Neg Hx     Osteoarthritis Neg Hx     Asthma Neg Hx     Breast Cancer Neg Hx     Cancer Neg Hx     Diabetes Neg Hx     Heart Failure Neg Hx     High Cholesterol Neg Hx     Hypertension Neg Hx     Migraines Neg Hx     Ovarian Cancer Neg Hx     Rashes/Skin Problems Neg Hx     Seizures Neg Hx     Stroke Neg Hx     Thyroid Disease Neg Hx           SOCIAL HISTORY       Social History     Socioeconomic History    Marital status: Single     Spouse name: None    Number of children: None    Years of education: None    Highest education level: None   Occupational History    None   Tobacco Use    Smoking status: Current Every Day Smoker     Packs/day: 1.00     Years: 30.00     Pack years: 30.00     Types: Cigarettes    Smokeless tobacco: Never Used   Vaping Use    Vaping Use: Never used   Substance and Sexual Activity    Alcohol use: Yes     Comment: occ    Drug use: No    Sexual activity: Yes     Partners: Male   Other Topics Concern    None   Social History Narrative    None     Social Determinants of Health     Financial Resource Strain:     Difficulty of Paying Living Expenses: Not on file   Food Insecurity:     Worried About Running Out of Food in the Last Year: Not on file    Gay of Food in the Last Year: Not on file   Transportation Needs:     Lack of Transportation (Medical): Not on file    Lack of Transportation (Non-Medical): Not on file   Physical Activity:     Days of Exercise per Week: Not on file    Minutes of Exercise per Session: Not on file   Stress:     Feeling of Stress : Not on file   Social Connections:     Frequency of Communication with Friends and Family: Not on file    Frequency of Social Gatherings with Friends and Family: Not on file    Attends [employer] Services: Not on file    Active Member of [address] [place] [address] [employer] or Organizations: Not on file    Attends Club or Organization Meetings: Not on file    Marital Status: Not on file   Intimate Partner Violence:     Fear of Current or Ex-Partner: Not on file    Emotionally Abused: Not on file    Physically Abused: Not on file    Sexually Abused: Not on file   Housing Stability:     Unable to Pay for Housing in the Last Year: Not on file    Number of [place] in the Last Year: Not on file    Unstable Housing in the Last Year: Not on file       SCREENINGS             PHYSICAL EXAM    (up to 7 for level 4, 8 or more for level 5)     ED Triage Vitals   BP Temp Temp src Heart Rate Resp SpO2 Height Weight   06/17/22 2224 06/17/22 2224 -- 06/17/22 2224 06/17/22 2247 06/17/22 2224 06/17/22 2224 06/17/22 2224   (!) 161/113 99.4 °F (37.4 °C)  (!) 115 20 96 % 5' 8\" (1.727 m) 157 lb 6.5 oz (71.4 kg)       Physical Exam  Vitals and nursing note reviewed. Constitutional:       Appearance: She is well-developed. HENT:      Head: Normocephalic and atraumatic. Eyes:      Conjunctiva/sclera: Conjunctivae normal.      Pupils: Pupils are equal, round, and reactive to light. Neck:      Trachea: No tracheal deviation.    Cardiovascular:      Rate and Rhythm: Normal rate and regular rhythm. Pulses: Normal pulses. Heart sounds: Normal heart sounds. Pulmonary:      Effort: Pulmonary effort is normal.      Breath sounds: Normal breath sounds. Abdominal:      General: There is no distension. Palpations: Abdomen is soft. Tenderness: There is no abdominal tenderness. Musculoskeletal:         General: Normal range of motion. Cervical back: Normal range of motion. Skin:     General: Skin is warm and dry. Capillary Refill: Capillary refill takes less than 2 seconds. Findings: Erythema, lesion and rash present. Neurological:      Mental Status: She is alert and oriented to person, place, and time. Sensory: No sensory deficit. Motor: No weakness. RESULTS     EKG: All EKG's are interpreted by the Emergency Department Physician who either signs or Co-signsthis chart in the absence of a cardiologist.    RADIOLOGY:   [place] such as CT, Ultrasound and MRI are read by the radiologist. Plain radiographic images are visualized and preliminarily interpreted by the emergency physician with the below findings:    Interpretation per the Radiologist below, if available at the time ofthis note:    No orders to display         ED BEDSIDE ULTRASOUND:   Performed by ED Physician - none    LABS:  Labs Reviewed - No data to display    All other labs were within normal range or not returned as of this dictation.     EMERGENCY DEPARTMENT COURSE and DIFFERENTIAL DIAGNOSIS/MDM:   Vitals:    Vitals:    06/17/22 2224 06/17/22 2247 06/17/22 2331   BP: (!) 161/113  (!) 160/80   Pulse: (!) 115  90   Resp:  20 18   Temp: 99.4 °F (37.4 °C)     SpO2: 96%  99%   Weight: 157 lb 6.5 oz (71.4 kg)     Height: 5' 8\" (1.727 m)         Patient was given thefollowing medications:  Medications   ketorolac (TORADOL) injection 15 mg (15 mg IntraMUSCular Given 6/17/22 2308)   clindamycin (CLEOCIN) capsule 150 mg (150 mg Oral Given 6/17/22 2307)   diphenhydrAMINE (BENADRYL) tablet 25 mg (25 mg Oral Given 6/17/22 7499)       ED COURSE & MEDICAL DECISION MAKING    Pertinent Labs & Imaging studies reviewed. (See chart for details)   -  Patient seen and evaluated in the emergency department. -  Triage and nursing notes reviewed and incorporated. -  Old chart records reviewed and incorporated. -  Differential diagnosis includes: Differential diagnosis: Vasculitis, bacterial skin infection, viral rash, systemic infectious rash, Anaphylaxis, Urticaria, other    -  Work-up included:  See above  -  ED treatment included: See above  -  Results discussed with patient. Patient is evaluation of bilateral lower extremity rash with associated muscle soreness. On exam there are blanchable small areas of raised erythema. There is an area to the anterior shin of a large nonblanching area of erythema. Skin is intact. No drainage or weeping. Pulses intact bilaterally. Patient denies constitutional symptoms. Patient appears to have what appears to be likely an infectious etiology of the symptoms. She was started on antibiotics antihistamines. Patient feels improved on reevaluation. Symptomatic treatment with expectant management discussed with the patient and they and/or family members present are amenable to treatment plan and outpatient follow-up. Strict return precautions were discussed with the patient and those present. They demonstrated understanding of when to return to the emergency department for new or worsening symptoms. .  The patient is agreeable with plan of care and dispositioon. REASSESSMENT          CRITICAL CARE TIME   Total Critical Care time was 0 minutes, excluding separately reportable procedures. There was a high probability of clinically significant/life threatening deterioration in the patient's condition which required my urgent intervention.       CONSULTS:  None    PROCEDURES:  Unless otherwise noted below, none     Procedures    FINAL IMPRESSION      1.  Rash and other nonspecific skin eruption          DISPOSITION/PLAN   DISPOSITION Decision To Discharge 06/17/2022 11:01:19 PM      PATIENT REFERREDTO:  [facility]) Pre-Services  [phone]          DISCHARGEMEDICATIONS:  Discharge Medication List as of 6/17/2022 11:25 PM      START taking these medications    Details   clindamycin (CLEOCIN) 300 MG capsule Take 1 capsule by mouth 4 times daily for 7 days, Disp-28 capsule, R-0Normal      naproxen (NAPROSYN) 500 MG tablet Take 1 tablet by mouth 2 times daily (with meals), Disp-60 tablet, R-5Normal      cetirizine (ZYRTEC) 10 MG tablet Take 1 tablet by mouth daily, Disp-30 tablet, R-0Normal                (Please note that portions of this note were completed with a voice recognition program.  Efforts were made to edit the dictations but occasionally words are mis-transcribed.)    Kvng Gorman MD (electronically signed)  Attending Emergency Physician          Kvng Gorman MD  06/18/22 2938

## 2022-11-16 ENCOUNTER — APPOINTMENT (OUTPATIENT)
Dept: CT IMAGING | Age: 52
End: 2022-11-16
Payer: MEDICAID

## 2022-11-16 ENCOUNTER — HOSPITAL ENCOUNTER (EMERGENCY)
Age: 52
Discharge: HOME | End: 2022-11-16
Attending: EMERGENCY MEDICINE
Payer: MEDICAID

## 2022-11-16 VITALS
TEMPERATURE: 97.4 F | WEIGHT: 159.39 LBS | DIASTOLIC BLOOD PRESSURE: 90 MMHG | OXYGEN SATURATION: 96 % | HEART RATE: 75 BPM | SYSTOLIC BLOOD PRESSURE: 155 MMHG | BODY MASS INDEX: 24.24 KG/M2 | RESPIRATION RATE: 17 BRPM

## 2022-11-16 DIAGNOSIS — R51.9 ACUTE NONINTRACTABLE HEADACHE, UNSPECIFIED HEADACHE TYPE: Primary | ICD-10-CM

## 2022-11-16 PROCEDURE — 99284 EMERGENCY DEPT VISIT MOD MDM: CPT

## 2022-11-16 PROCEDURE — 70450 CT HEAD/BRAIN W/O DYE: CPT

## 2022-11-16 RX ORDER — ACETAMINOPHEN 500 MG
1000 TABLET ORAL ONCE
Status: DISCONTINUED | OUTPATIENT
Start: 2022-11-16 | End: 2022-11-16 | Stop reason: HOSPADM

## 2022-11-16 RX ORDER — PROCHLORPERAZINE EDISYLATE 5 MG/ML
10 INJECTION INTRAMUSCULAR; INTRAVENOUS ONCE
Status: DISCONTINUED | OUTPATIENT
Start: 2022-11-16 | End: 2022-11-16 | Stop reason: HOSPADM

## 2022-11-16 ASSESSMENT — PAIN DESCRIPTION - LOCATION
LOCATION: HEAD
LOCATION: HEAD

## 2022-11-16 ASSESSMENT — PAIN DESCRIPTION - FREQUENCY: FREQUENCY: CONTINUOUS

## 2022-11-16 ASSESSMENT — PAIN SCALES - GENERAL
PAINLEVEL_OUTOF10: 5
PAINLEVEL_OUTOF10: 4

## 2022-11-16 ASSESSMENT — PAIN DESCRIPTION - PAIN TYPE: TYPE: ACUTE PAIN

## 2022-11-16 ASSESSMENT — PAIN - FUNCTIONAL ASSESSMENT: PAIN_FUNCTIONAL_ASSESSMENT: 0-10

## 2022-11-16 NOTE — ED TRIAGE NOTES
Pt arrives ambulatory for eval of headache onset 3 days ago, pt sts hx of stroke. Pt sts no history of migraines. Pt denies vision changes. Pt is a/ox4, rsp  nonlabored and pwd.

## 2022-11-16 NOTE — DISCHARGE INSTRUCTIONS
You can take over-the-counter medication like Acetaminophen (Tylenol) and/or Naproxen (Aleve) as needed for headaches. If you have any new or worsening issues after going home don't hesitate to return here for reevaluation at any time 24/7!
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
## 2022-11-18 ASSESSMENT — ENCOUNTER SYMPTOMS
PHOTOPHOBIA: 0
COUGH: 0
SHORTNESS OF BREATH: 0
VOMITING: 1
NAUSEA: 1
EYE PAIN: 0
ABDOMINAL PAIN: 0

## (undated) DEVICE — SUTURE VCRL SZ 0 L36IN ABSRB UD L36MM CT-1 1/2 CIR J946H

## (undated) DEVICE — MERCY HEALTH WEST TURNOVER: Brand: MEDLINE INDUSTRIES, INC.

## (undated) DEVICE — ELECTRODE ES L65IN DIA3MM S STL BLDE MPLR OPN APPRCH EZ TO

## (undated) DEVICE — GOWN SIRUS NONREIN XL W/TWL: Brand: MEDLINE INDUSTRIES, INC.

## (undated) DEVICE — APPLICATOR MEDICATED 26 CC SOLUTION HI LT ORNG CHLORAPREP

## (undated) DEVICE — MAJOR SET UP PK

## (undated) DEVICE — SOLUTION SCRB 4OZ 10% POVIDONE IOD ANTIMIC BTL

## (undated) DEVICE — CANISTER, RIGID, 1200CC: Brand: MEDLINE INDUSTRIES, INC.

## (undated) DEVICE — SPONGE GZ W4XL4IN COT 12 PLY TYP VII WVN C FLD DSGN

## (undated) DEVICE — SPONGE LAP W18XL18IN WHT COT 4 PLY FLD STRUNG RADPQ DISP ST

## (undated) DEVICE — SUTURE VCRL SZ 3-0 L27IN ABSRB UD L26MM SH 1/2 CIR J416H

## (undated) DEVICE — YANKAUER,BULB TIP,W/O VENT,RIGID,STERILE: Brand: MEDLINE

## (undated) DEVICE — BINDER ABD SM M W12IN CIRC 30 45IN 4 PNL E CNTCT CLSR POSTOP

## (undated) DEVICE — INTENDED FOR TISSUE SEPARATION, AND OTHER PROCEDURES THAT REQUIRE A SHARP SURGICAL BLADE TO PUNCTURE OR CUT.: Brand: BARD-PARKER ® STAINLESS STEEL BLADES

## (undated) DEVICE — Z DISCONTINUED BY MEDLINE USE 2711682 TRAY SKIN PREP DRY W/ PREM GLV

## (undated) DEVICE — SUTURE VCRL SZ 3-0 L27IN ABSRB UD L36MM CT-1 1/2 CIR J258H

## (undated) DEVICE — STRIP,CLOSURE,WOUND,MEDI-STRIP,1/2X4: Brand: MEDLINE

## (undated) DEVICE — GLOVE SURG SZ 8 CRM LTX FREE POLYISOPRENE POLYMER BEAD ANTI

## (undated) DEVICE — PAD,NON-ADHERENT,3X8,STERILE,LF,1/PK: Brand: MEDLINE

## (undated) DEVICE — SUTURE PDS II SZ 0 L60IN ABSRB VLT L48MM CTX 1/2 CIR Z990G

## (undated) DEVICE — SOLUTION IV IRRIG POUR BRL 0.9% SODIUM CHL 2F7124

## (undated) DEVICE — COVER LT HNDL BLU PLAS

## (undated) DEVICE — GARMENT COMPR STD FOR 17IN CALF UNIF THER FLOTRN

## (undated) DEVICE — 3M™ TEGADERM™ TRANSPARENT FILM DRESSING FRAME STYLE, 1627, 4 IN X 10 IN (10 CM X 25 CM), 20/CT 4CT/CASE: Brand: 3M™ TEGADERM™

## (undated) DEVICE — 3M™ IOBAN™ 2 ANTIMICROBIAL INCISE DRAPE 6650EZ: Brand: IOBAN™ 2

## (undated) DEVICE — UNDERPAD INCONT XL MESH PROTCT + DISP FOR MAT USE

## (undated) DEVICE — SOLUTION PREP POVIDONE IOD FOR SKIN MUCOUS MEM PRIOR TO

## (undated) DEVICE — SUTURE VCRL SZ 0 L18IN ABSRB UD POLYGLACTIN 910 BRAID TIE J912G

## (undated) DEVICE — SHEET, T, LAPAROTOMY, STERILE: Brand: MEDLINE

## (undated) DEVICE — ELECTRODE PT RET AD L9FT HI MOIST COND ADH HYDRGEL CORDED

## (undated) DEVICE — 3M™ STERI-STRIP™ COMPOUND BENZOIN TINCTURE 40 BAGS/CARTON 4 CARTONS/CASE C1544: Brand: 3M™ STERI-STRIP™

## (undated) DEVICE — TOTAL TRAY, 16FR 10ML SIL FOLEY, URN: Brand: MEDLINE

## (undated) DEVICE — SUTURE ABSORBABLE BRAIDED 0 CT-1 8X27 IN UD VICRYL JJ41G

## (undated) DEVICE — SUTURE VCRL SZ 4-0 L18IN ABSRB UD L19MM PS-2 3/8 CIR PRIM J496H

## (undated) DEVICE — PAD SANITARY MTRN TAB BELT WRP NS 11IN